# Patient Record
Sex: MALE | Race: WHITE | NOT HISPANIC OR LATINO | Employment: UNEMPLOYED | RURAL
[De-identification: names, ages, dates, MRNs, and addresses within clinical notes are randomized per-mention and may not be internally consistent; named-entity substitution may affect disease eponyms.]

---

## 2020-10-15 ENCOUNTER — HISTORICAL (OUTPATIENT)
Dept: ADMINISTRATIVE | Facility: HOSPITAL | Age: 50
End: 2020-10-15

## 2020-10-15 LAB
ALBUMIN SERPL BCP-MCNC: 4.1 G/DL (ref 3.5–5)
ALBUMIN/GLOB SERPL: 1.2 {RATIO}
ALP SERPL-CCNC: 84 U/L (ref 45–115)
ALT SERPL W P-5'-P-CCNC: 44 U/L (ref 16–61)
ANION GAP SERPL CALCULATED.3IONS-SCNC: 8 MMOL/L (ref 7–16)
AST SERPL W P-5'-P-CCNC: 20 U/L (ref 15–37)
BASOPHILS # BLD AUTO: 0.04 X10E3/UL (ref 0–0.2)
BASOPHILS NFR BLD AUTO: 0.6 % (ref 0–1)
BILIRUB SERPL-MCNC: 0.3 MG/DL (ref 0–1.2)
BUN SERPL-MCNC: 14 MG/DL (ref 7–18)
BUN/CREAT SERPL: 17
CALCIUM SERPL-MCNC: 8.8 MG/DL (ref 8.5–10.1)
CHLORIDE SERPL-SCNC: 110 MMOL/L (ref 98–107)
CHOLEST SERPL-MCNC: 190 MG/DL
CHOLEST/HDLC SERPL: 4.4 {RATIO}
CO2 SERPL-SCNC: 28 MMOL/L (ref 21–32)
CREAT SERPL-MCNC: 0.83 MG/DL (ref 0.7–1.3)
EOSINOPHIL # BLD AUTO: 0.1 X10E3/UL (ref 0–0.5)
EOSINOPHIL NFR BLD AUTO: 1.6 % (ref 1–4)
ERYTHROCYTE [DISTWIDTH] IN BLOOD BY AUTOMATED COUNT: 16.3 % (ref 11.5–14.5)
EST. AVERAGE GLUCOSE BLD GHB EST-MCNC: 134 MG/DL
GLOBULIN SER-MCNC: 3.5 G/DL (ref 2–4)
GLUCOSE SERPL-MCNC: 96 MG/DL (ref 74–106)
HBA1C MFR BLD HPLC: 6.6 %
HCT VFR BLD AUTO: 41.6 % (ref 40–54)
HDLC SERPL-MCNC: 43 MG/DL
HGB BLD-MCNC: 13.3 G/DL (ref 13.5–18)
IMM GRANULOCYTES # BLD AUTO: 0.02 X10E3/UL (ref 0–0.04)
IMM GRANULOCYTES NFR BLD: 0.3 % (ref 0–0.4)
LDLC SERPL CALC-MCNC: 106 MG/DL
LYMPHOCYTES # BLD AUTO: 1.7 X10E3/UL (ref 1–4.8)
LYMPHOCYTES NFR BLD AUTO: 27.4 % (ref 27–41)
MCH RBC QN AUTO: 26.2 PG (ref 27–31)
MCHC RBC AUTO-ENTMCNC: 32 G/DL (ref 32–36)
MCV RBC AUTO: 82.1 FL (ref 80–96)
MONOCYTES # BLD AUTO: 0.57 X10E3/UL (ref 0–0.8)
MONOCYTES NFR BLD AUTO: 9.2 % (ref 2–6)
MPC BLD CALC-MCNC: 11.1 FL (ref 9.4–12.4)
NEUTROPHILS # BLD AUTO: 3.78 X10E3/UL (ref 1.8–7.7)
NEUTROPHILS NFR BLD AUTO: 60.9 % (ref 53–65)
NRBC # BLD AUTO: 0 X10E3/UL (ref 0–0)
NRBC, AUTO (.00): 0 /100 (ref 0–0)
PLATELET # BLD AUTO: 289 X10E3/UL (ref 150–400)
POTASSIUM SERPL-SCNC: 4 MMOL/L (ref 3.5–5.1)
PROT SERPL-MCNC: 7.6 G/DL (ref 6.4–8.2)
RBC # BLD AUTO: 5.07 X10E6/UL (ref 4.6–6.2)
SODIUM SERPL-SCNC: 142 MMOL/L (ref 136–145)
TRIGL SERPL-MCNC: 206 MG/DL
WBC # BLD AUTO: 6.21 X10E3/UL (ref 4.5–11)

## 2020-11-29 ENCOUNTER — HISTORICAL (OUTPATIENT)
Dept: ADMINISTRATIVE | Facility: HOSPITAL | Age: 50
End: 2020-11-29

## 2021-08-26 ENCOUNTER — TELEPHONE (OUTPATIENT)
Dept: CARDIOLOGY | Facility: CLINIC | Age: 51
End: 2021-08-26

## 2021-08-26 DIAGNOSIS — I71.21 ASCENDING AORTIC ANEURYSM: Primary | ICD-10-CM

## 2021-08-26 RX ORDER — BUPROPION HYDROCHLORIDE 300 MG/1
TABLET ORAL
COMMUNITY
Start: 2021-08-16

## 2021-08-26 RX ORDER — PROPRANOLOL HYDROCHLORIDE 20 MG/1
TABLET ORAL
COMMUNITY
Start: 2021-04-19

## 2021-08-26 RX ORDER — OXYBUTYNIN CHLORIDE 5 MG/1
TABLET ORAL
COMMUNITY
Start: 2021-08-11

## 2021-08-26 RX ORDER — LEVOTHYROXINE SODIUM 125 UG/1
TABLET ORAL
COMMUNITY
Start: 2021-07-06

## 2021-08-26 RX ORDER — METFORMIN HYDROCHLORIDE 500 MG/1
TABLET, EXTENDED RELEASE ORAL
COMMUNITY
Start: 2021-08-08

## 2021-08-26 RX ORDER — RABEPRAZOLE SODIUM 20 MG/1
TABLET, DELAYED RELEASE ORAL
COMMUNITY
Start: 2021-06-15

## 2021-08-26 RX ORDER — NITROFURANTOIN 25; 75 MG/1; MG/1
CAPSULE ORAL
COMMUNITY
Start: 2021-06-01

## 2021-08-26 RX ORDER — SERTRALINE HYDROCHLORIDE 50 MG/1
TABLET, FILM COATED ORAL
COMMUNITY
Start: 2021-03-31

## 2021-08-26 RX ORDER — CICLOPIROX 7.7 MG/G
GEL TOPICAL
COMMUNITY
Start: 2021-07-01

## 2021-08-26 RX ORDER — HYDROCODONE BITARTRATE AND ACETAMINOPHEN 5; 325 MG/1; MG/1
1 TABLET ORAL EVERY 4 HOURS PRN
COMMUNITY
Start: 2021-06-29

## 2021-08-26 RX ORDER — BUPROPION HYDROCHLORIDE 150 MG/1
TABLET ORAL
COMMUNITY
Start: 2021-03-03

## 2021-08-26 RX ORDER — ARIPIPRAZOLE 5 MG/1
TABLET ORAL
COMMUNITY
Start: 2021-06-05

## 2021-08-26 RX ORDER — ZOLPIDEM TARTRATE 10 MG/1
TABLET ORAL
COMMUNITY
Start: 2021-05-19

## 2021-08-26 RX ORDER — ALBUTEROL SULFATE 90 UG/1
AEROSOL, METERED RESPIRATORY (INHALATION)
COMMUNITY
Start: 2021-03-03 | End: 2023-02-11 | Stop reason: SDUPTHER

## 2021-09-02 ENCOUNTER — HOSPITAL ENCOUNTER (OUTPATIENT)
Dept: RADIOLOGY | Facility: HOSPITAL | Age: 51
Discharge: HOME | End: 2021-09-02
Attending: STUDENT IN AN ORGANIZED HEALTH CARE EDUCATION/TRAINING PROGRAM
Payer: COMMERCIAL

## 2021-09-02 DIAGNOSIS — I71.21 ASCENDING AORTIC ANEURYSM: ICD-10-CM

## 2021-09-02 LAB — CREAT SERPL-MCNC: 0.9 MG/DL (ref 0.6–1.3)

## 2021-09-02 PROCEDURE — 71275 CT ANGIOGRAPHY CHEST: CPT | Mod: 26,,, | Performed by: RADIOLOGY

## 2021-09-02 PROCEDURE — 71275 CT ANGIOGRAPHY CHEST: CPT | Mod: TC

## 2021-09-02 PROCEDURE — 71275 CTA CHEST NON CORONARY: ICD-10-PCS | Mod: 26,,, | Performed by: RADIOLOGY

## 2021-09-02 PROCEDURE — 82565 ASSAY OF CREATININE: CPT

## 2021-09-02 PROCEDURE — 25500020 PHARM REV CODE 255: Performed by: STUDENT IN AN ORGANIZED HEALTH CARE EDUCATION/TRAINING PROGRAM

## 2021-09-02 RX ADMIN — IOPAMIDOL 100 ML: 755 INJECTION, SOLUTION INTRAVENOUS at 02:09

## 2021-12-22 ENCOUNTER — OFFICE VISIT (OUTPATIENT)
Dept: CARDIOLOGY | Facility: CLINIC | Age: 51
End: 2021-12-22
Payer: COMMERCIAL

## 2021-12-22 VITALS
HEIGHT: 67 IN | DIASTOLIC BLOOD PRESSURE: 80 MMHG | HEART RATE: 67 BPM | BODY MASS INDEX: 35.47 KG/M2 | RESPIRATION RATE: 18 BRPM | WEIGHT: 226 LBS | SYSTOLIC BLOOD PRESSURE: 140 MMHG

## 2021-12-22 DIAGNOSIS — R94.39 ABNORMAL CARDIOVASCULAR STRESS TEST: ICD-10-CM

## 2021-12-22 DIAGNOSIS — R94.39 ABNORMAL CARDIOVASCULAR STRESS TEST: Primary | ICD-10-CM

## 2021-12-22 DIAGNOSIS — I71.21 ASCENDING AORTIC ANEURYSM: ICD-10-CM

## 2021-12-22 PROCEDURE — 99214 PR OFFICE/OUTPT VISIT, EST, LEVL IV, 30-39 MIN: ICD-10-PCS | Mod: S$PBB,,, | Performed by: STUDENT IN AN ORGANIZED HEALTH CARE EDUCATION/TRAINING PROGRAM

## 2021-12-22 PROCEDURE — 99214 OFFICE O/P EST MOD 30 MIN: CPT | Mod: S$PBB,,, | Performed by: STUDENT IN AN ORGANIZED HEALTH CARE EDUCATION/TRAINING PROGRAM

## 2021-12-22 PROCEDURE — 93010 ELECTROCARDIOGRAM REPORT: CPT | Mod: S$PBB,,, | Performed by: STUDENT IN AN ORGANIZED HEALTH CARE EDUCATION/TRAINING PROGRAM

## 2021-12-22 PROCEDURE — 93005 ELECTROCARDIOGRAM TRACING: CPT | Mod: PBBFAC | Performed by: STUDENT IN AN ORGANIZED HEALTH CARE EDUCATION/TRAINING PROGRAM

## 2021-12-22 PROCEDURE — 99214 OFFICE O/P EST MOD 30 MIN: CPT | Mod: PBBFAC | Performed by: STUDENT IN AN ORGANIZED HEALTH CARE EDUCATION/TRAINING PROGRAM

## 2021-12-22 PROCEDURE — 93010 EKG 12-LEAD: ICD-10-PCS | Mod: S$PBB,,, | Performed by: STUDENT IN AN ORGANIZED HEALTH CARE EDUCATION/TRAINING PROGRAM

## 2021-12-22 RX ORDER — MELATONIN 10 MG
CAPSULE ORAL
COMMUNITY

## 2023-02-11 ENCOUNTER — OFFICE VISIT (OUTPATIENT)
Dept: FAMILY MEDICINE | Facility: CLINIC | Age: 53
End: 2023-02-11
Payer: COMMERCIAL

## 2023-02-11 VITALS
HEART RATE: 69 BPM | RESPIRATION RATE: 19 BRPM | BODY MASS INDEX: 36.1 KG/M2 | OXYGEN SATURATION: 97 % | DIASTOLIC BLOOD PRESSURE: 84 MMHG | SYSTOLIC BLOOD PRESSURE: 134 MMHG | HEIGHT: 67 IN | WEIGHT: 230 LBS | TEMPERATURE: 99 F

## 2023-02-11 DIAGNOSIS — J01.00 ACUTE MAXILLARY SINUSITIS, RECURRENCE NOT SPECIFIED: Primary | ICD-10-CM

## 2023-02-11 DIAGNOSIS — R05.9 COUGH, UNSPECIFIED TYPE: ICD-10-CM

## 2023-02-11 PROBLEM — R01.1 HEART MURMUR: Status: ACTIVE | Noted: 2023-02-11

## 2023-02-11 PROBLEM — E66.9 OBESITY: Status: ACTIVE | Noted: 2023-02-11

## 2023-02-11 PROBLEM — I10 ESSENTIAL HYPERTENSION: Status: ACTIVE | Noted: 2023-02-11

## 2023-02-11 PROBLEM — G47.61 PERIODIC LIMB MOVEMENT DISORDER: Status: ACTIVE | Noted: 2023-02-11

## 2023-02-11 PROBLEM — J45.909 ASTHMA: Status: ACTIVE | Noted: 2023-02-11

## 2023-02-11 PROBLEM — G47.33 OBSTRUCTIVE SLEEP APNEA SYNDROME: Status: ACTIVE | Noted: 2023-02-11

## 2023-02-11 PROBLEM — G47.00 INSOMNIA: Status: ACTIVE | Noted: 2023-02-11

## 2023-02-11 PROBLEM — K21.9 GASTROESOPHAGEAL REFLUX DISEASE: Status: ACTIVE | Noted: 2023-02-11

## 2023-02-11 PROBLEM — E11.9 DIABETES MELLITUS WITHOUT COMPLICATION: Status: ACTIVE | Noted: 2023-02-11

## 2023-02-11 PROBLEM — F32.A DEPRESSED: Status: ACTIVE | Noted: 2023-02-11

## 2023-02-11 PROBLEM — N21.0 CALCULUS OF BLADDER: Status: ACTIVE | Noted: 2023-02-11

## 2023-02-11 LAB
CTP QC/QA: YES
FLUAV AG NPH QL: NEGATIVE
FLUBV AG NPH QL: NEGATIVE
SARS-COV-2 AG RESP QL IA.RAPID: NEGATIVE

## 2023-02-11 PROCEDURE — 87428 SARSCOV & INF VIR A&B AG IA: CPT | Mod: QW,,, | Performed by: NURSE PRACTITIONER

## 2023-02-11 PROCEDURE — 99051 PR MEDICAL SERVICES, EVE/WKEND/HOLIDAY: ICD-10-PCS | Mod: ,,, | Performed by: NURSE PRACTITIONER

## 2023-02-11 PROCEDURE — 99214 PR OFFICE/OUTPT VISIT, EST, LEVL IV, 30-39 MIN: ICD-10-PCS | Mod: ,,, | Performed by: NURSE PRACTITIONER

## 2023-02-11 PROCEDURE — 87428 POCT SARS-COV2 (COVID) WITH FLU ANTIGEN: ICD-10-PCS | Mod: QW,,, | Performed by: NURSE PRACTITIONER

## 2023-02-11 PROCEDURE — 99214 OFFICE O/P EST MOD 30 MIN: CPT | Mod: ,,, | Performed by: NURSE PRACTITIONER

## 2023-02-11 PROCEDURE — 99051 MED SERV EVE/WKEND/HOLIDAY: CPT | Mod: ,,, | Performed by: NURSE PRACTITIONER

## 2023-02-11 RX ORDER — GUAIFENESIN 1200 MG/1
1 TABLET, EXTENDED RELEASE ORAL 2 TIMES DAILY PRN
Qty: 20 TABLET | Refills: 0 | Status: SHIPPED | OUTPATIENT
Start: 2023-02-11 | End: 2023-02-21

## 2023-02-11 RX ORDER — PREDNISONE 20 MG/1
40 TABLET ORAL DAILY
Qty: 10 TABLET | Refills: 0 | Status: SHIPPED | OUTPATIENT
Start: 2023-02-11 | End: 2023-02-16

## 2023-02-11 RX ORDER — ALBUTEROL SULFATE 90 UG/1
1-2 AEROSOL, METERED RESPIRATORY (INHALATION) EVERY 6 HOURS PRN
Qty: 18 G | Refills: 2 | Status: SHIPPED | OUTPATIENT
Start: 2023-02-11

## 2023-02-11 RX ORDER — AZITHROMYCIN 250 MG/1
TABLET, FILM COATED ORAL
Qty: 6 TABLET | Refills: 0 | Status: SHIPPED | OUTPATIENT
Start: 2023-02-11 | End: 2023-02-16

## 2023-02-11 NOTE — PATIENT INSTRUCTIONS
-Continue current medications as prescribed.  -Over-the-counter sinus irrigation or rinse as directed

## 2023-02-11 NOTE — PROGRESS NOTES
[facility]    Chief Complaint      Chief Complaint   Patient presents with    Cough    Nasal Congestion    Chest Congestion     States with productive cough of thick mucus no fever noted with OTC medications in use      History of Present Illness      Kartik Segura is a 52 y.o. male with chronic conditions of GERD, hypertension, and ELENA who presents today for complaints of URI symptoms x1 week.    URI   This is a new problem. The current episode started in the past 7 days. The problem has been gradually worsening. There has been no fever. Associated symptoms include congestion, coughing (dark colored), sinus pain and a sore throat. Pertinent negatives include no abdominal pain, diarrhea, dysuria, ear pain, headaches, nausea, rash, vomiting or wheezing. He has tried decongestant for the symptoms. The treatment provided no relief.     Past Medical History:  Past Medical History:   Diagnosis Date    Ascending aortic aneurysm     Diabetes mellitus      Past Surgical History:   has no past surgical history on file.    Social History:  Social History     Tobacco Use    Smoking status: Never    Smokeless tobacco: Never   Substance Use Topics    Alcohol use: Not Currently     I personally reviewed all past medical, surgical, and social.     Review of Systems   Constitutional:  Negative for fever and malaise/fatigue.   HENT:  Positive for congestion, sinus pain and sore throat. Negative for ear pain.    Eyes:  Negative for discharge and redness.   Respiratory:  Positive for cough (dark colored) and sputum production. Negative for shortness of breath and wheezing. Hemoptysis: dark discoloration.   Gastrointestinal:  Negative for abdominal pain, diarrhea, nausea and vomiting.   Genitourinary:  Negative for dysuria.   Musculoskeletal:  Negative for myalgias.   Skin:  Negative for itching and rash.   Neurological:  Negative for dizziness and headaches.   Endo/Heme/Allergies:  Does not bruise/bleed easily.       Medications:  Outpatient Encounter Medications as of 2/11/2023   Medication Sig Dispense Refill    ARIPiprazole (ABILIFY) 5 MG Tab       buPROPion (WELLBUTRIN XL) 150 MG TB24 tablet       buPROPion (WELLBUTRIN XL) 300 MG 24 hr tablet       ciclopirox 0.77 % Gel       levothyroxine (SYNTHROID) 125 MCG tablet       melatonin 10 mg Cap 1 tablet at bedtime as needed      metFORMIN (GLUCOPHAGE-XR) 500 MG ER 24hr tablet       oxybutynin (DITROPAN) 5 MG Tab       propranoloL (INDERAL) 20 MG tablet       RABEprazole (ACIPHEX) 20 mg tablet       sertraline (ZOLOFT) 50 MG tablet       zolpidem (AMBIEN) 10 mg Tab       [DISCONTINUED] albuterol (PROVENTIL/VENTOLIN HFA) 90 mcg/actuation inhaler       albuterol (PROVENTIL/VENTOLIN HFA) 90 mcg/actuation inhaler Inhale 1-2 puffs into the lungs every 6 (six) hours as needed for Wheezing or Shortness of Breath. 18 g 2    azithromycin (Z-MECCA) 250 MG tablet Take 2 tablets by mouth on day 1; Take 1 tablet by mouth on days 2-5 6 tablet 0    guaiFENesin 1,200 mg Ta12 Take 1 tablet by mouth 2 (two) times daily as needed (congestion). 20 tablet 0    HYDROcodone-acetaminophen (NORCO) 5-325 mg per tablet Take 1 tablet by mouth every 4 (four) hours as needed.      nitrofurantoin, macrocrystal-monohydrate, (MACROBID) 100 MG capsule       predniSONE (DELTASONE) 20 MG tablet Take 2 tablets (40 mg total) by mouth once daily. for 5 days 10 tablet 0     No facility-administered encounter medications on file as of 2/11/2023.     Allergies:  Review of patient's allergies indicates:   Allergen Reactions    Cefuroxime     Cefuroxime axetil Hives    Ciprofloxacin Hives    Doxycycline hyclate Hives    Sulfa (sulfonamide antibiotics)      Health Maintenance:    There is no immunization history on file for this patient.   Health Maintenance   Topic Date Due    Hepatitis C Screening  Never done    Lipid Panel  Never done    TETANUS VACCINE  Never done      Physical Exam      Vital Signs  Temp: 99.1 °F  "(37.3 °C)  Pulse: 69  Resp: 19  SpO2: 97 %  BP: 134/84  Pain Score:   5  Pain Loc: Throat  Height and Weight  Height: 5' 7" (170.2 cm)  Weight: 104.3 kg (230 lb)  BSA (Calculated - sq m): 2.22 sq meters  BMI (Calculated): 36  Weight in (lb) to have BMI = 25: 159.3]    Physical Exam  Vitals reviewed.   Constitutional:       Appearance: Normal appearance.   HENT:      Head: Normocephalic.      Right Ear: External ear normal.      Left Ear: External ear normal.      Nose: Congestion present.      Right Turbinates: Swollen.      Left Turbinates: Swollen.      Right Sinus: Maxillary sinus tenderness present.      Left Sinus: Maxillary sinus tenderness present.      Mouth/Throat:      Mouth: Mucous membranes are moist.      Pharynx: Posterior oropharyngeal erythema present.   Eyes:      General:         Right eye: No discharge.         Left eye: No discharge.      Conjunctiva/sclera: Conjunctivae normal.   Cardiovascular:      Rate and Rhythm: Normal rate and regular rhythm.      Pulses: Normal pulses.      Heart sounds: Normal heart sounds. No murmur heard.  Pulmonary:      Effort: Pulmonary effort is normal. No respiratory distress.      Breath sounds: Normal breath sounds.   Abdominal:      General: Bowel sounds are normal.   Musculoskeletal:         General: Normal range of motion.      Cervical back: Normal range of motion.   Skin:     General: Skin is warm.      Capillary Refill: Capillary refill takes less than 2 seconds.   Neurological:      Mental Status: He is alert and oriented to person, place, and time.   Psychiatric:         Mood and Affect: Mood normal.         Behavior: Behavior normal.         Thought Content: Thought content normal.         Judgment: Judgment normal.      Laboratory:    CBC:  Recent Labs   Lab 10/15/20  1209   WBC 6.21   RBC 5.07   Hemoglobin 13.3 L   Hematocrit 41.6   Platelet Count 289   MCV 82.1   MCH 26.2 L   MCHC 32.0     CMP:  Recent Labs   Lab 10/15/20  1209   Glucose 96   Calcium " 8.8   Albumin 4.1   Total Protein 7.6   Sodium 142   Potassium 4.0   CO2 28   Chloride 110 H   BUN 14   Alk Phos 84   ALT 44   AST 20   Bilirubin, Total 0.3     LIPIDS:  Recent Labs   Lab 10/15/20  1209   HDL Cholesterol 43   Cholesterol 190   Triglycerides 206   LDL Calculated 106   Cholesterol/HDL Ratio (Risk Factor) 4.4     A1C:  Recent Labs   Lab 10/15/20  1209   Hemoglobin A1C 6.6     Assessment/Plan     Problem List Items Addressed This Visit    None  Visit Diagnoses       Acute maxillary sinusitis, recurrence not specified    -  Primary    Relevant Medications    azithromycin (Z-MECCA) 250 MG tablet    predniSONE (DELTASONE) 20 MG tablet    Cough, unspecified type        Relevant Medications    guaiFENesin 1,200 mg Ta12    Other Relevant Orders    POCT SARS-COV2 (COVID) with Flu Antigen (Completed)           Kartik Segura is a 52 y.o.male with:    1. Cough, unspecified type  - POCT SARS-COV2 (COVID) with Flu Antigen  - guaiFENesin 1,200 mg Ta12; Take 1 tablet by mouth 2 (two) times daily as needed (congestion).  Dispense: 20 tablet; Refill: 0    2. Acute maxillary sinusitis, recurrence not specified  - azithromycin (Z-MECCA) 250 MG tablet; Take 2 tablets by mouth on day 1; Take 1 tablet by mouth on days 2-5  Dispense: 6 tablet; Refill: 0  - predniSONE (DELTASONE) 20 MG tablet; Take 2 tablets (40 mg total) by mouth once daily. for 5 days  Dispense: 10 tablet; Refill: 0  -Continue current medications as prescribed.  -Over-the-counter sinus irrigation or rinse as directed.    Chronic conditions status updated as per HPI.  Other than changes above, cont current medications and maintain follow up with specialists.  Return to clinic as needed.    Danielle Nicole, P  [facility]

## 2023-06-30 ENCOUNTER — OFFICE VISIT (OUTPATIENT)
Dept: CARDIOLOGY | Facility: CLINIC | Age: 53
End: 2023-06-30
Payer: COMMERCIAL

## 2023-06-30 VITALS
RESPIRATION RATE: 18 BRPM | DIASTOLIC BLOOD PRESSURE: 80 MMHG | HEART RATE: 69 BPM | BODY MASS INDEX: 38.14 KG/M2 | WEIGHT: 243 LBS | SYSTOLIC BLOOD PRESSURE: 120 MMHG | HEIGHT: 67 IN

## 2023-06-30 DIAGNOSIS — I10 ESSENTIAL HYPERTENSION: Primary | ICD-10-CM

## 2023-06-30 DIAGNOSIS — I10 ESSENTIAL HYPERTENSION: ICD-10-CM

## 2023-06-30 DIAGNOSIS — I71.21 ANEURYSM OF ASCENDING AORTA WITHOUT RUPTURE: Primary | ICD-10-CM

## 2023-06-30 PROCEDURE — 99215 OFFICE O/P EST HI 40 MIN: CPT | Mod: PBBFAC | Performed by: STUDENT IN AN ORGANIZED HEALTH CARE EDUCATION/TRAINING PROGRAM

## 2023-06-30 PROCEDURE — 99214 PR OFFICE/OUTPT VISIT, EST, LEVL IV, 30-39 MIN: ICD-10-PCS | Mod: S$PBB,,, | Performed by: STUDENT IN AN ORGANIZED HEALTH CARE EDUCATION/TRAINING PROGRAM

## 2023-06-30 PROCEDURE — 99214 OFFICE O/P EST MOD 30 MIN: CPT | Mod: S$PBB,,, | Performed by: STUDENT IN AN ORGANIZED HEALTH CARE EDUCATION/TRAINING PROGRAM

## 2023-06-30 PROCEDURE — 93010 ELECTROCARDIOGRAM REPORT: CPT | Mod: S$PBB,,, | Performed by: STUDENT IN AN ORGANIZED HEALTH CARE EDUCATION/TRAINING PROGRAM

## 2023-06-30 PROCEDURE — 93010 EKG 12-LEAD: ICD-10-PCS | Mod: S$PBB,,, | Performed by: STUDENT IN AN ORGANIZED HEALTH CARE EDUCATION/TRAINING PROGRAM

## 2023-06-30 PROCEDURE — 93005 ELECTROCARDIOGRAM TRACING: CPT | Mod: PBBFAC | Performed by: STUDENT IN AN ORGANIZED HEALTH CARE EDUCATION/TRAINING PROGRAM

## 2023-06-30 RX ORDER — IRON,CARB/VIT C/VIT B12/FOLIC 100-250-1
1 TABLET ORAL
COMMUNITY
Start: 2023-04-16

## 2023-06-30 NOTE — ASSESSMENT & PLAN NOTE
Ascending Aortic Aneursym: 4.3cm in 2016 and then 4.7cm on a CTA at [place]. [facility] with poor acoustic windows.   - Had a CTA 9/2021; visually appears slighlty enlarged however radiology reported it at 3.6cm. (my measurement 4.1-4.3)  - BP goal 130/80  - Avoid strenous exercise  - Repeat CTA aorta for surviellene; If stable can switch to 3-5 year surviellence

## 2023-06-30 NOTE — PROGRESS NOTES
"PCP: Cher Blount MD (Inactive)    Referring Provider:     Subjective:   Kartik Segura is a 52 y.o. male with hx of DM, HTN, stable ascending aortic aneurysm (4.1-4.3cm) who presents for followup.     6/30/203 - Doing well. Denies any symptoms.     12/2021 - Doing well. A1c is controlled at 5.4. Denies any CP, SOB, AYERS.     Fhx:   Shx: denies smoking, etoh or drug use     EKG 12/22/21 - NSR   CTA aorta 9/2021 - No significant enlargement of the ascending aorta      Lab Results   Component Value Date     10/15/2020    K 4.0 10/15/2020     (H) 10/15/2020    CO2 28 10/15/2020    BUN 14 10/15/2020    CREATININE 0.830 10/15/2020    CALCIUM 8.8 10/15/2020    ANIONGAP 8 10/15/2020    ESTGFRAFRICA 127 10/15/2020    EGFRNONAA 105 10/15/2020       Lab Results   Component Value Date    CHOL 190 10/15/2020     Lab Results   Component Value Date    HDL 43 10/15/2020     Lab Results   Component Value Date    LDLCALC 106 10/15/2020     Lab Results   Component Value Date    TRIG 206 10/15/2020     Lab Results   Component Value Date    CHOLHDL 4.4 10/15/2020       Lab Results   Component Value Date    WBC 6.21 10/15/2020    HGB 13.3 (L) 10/15/2020    HCT 41.6 10/15/2020    MCV 82.1 10/15/2020     10/15/2020           Review of Systems   Respiratory:  Negative for cough and shortness of breath.    Cardiovascular:  Negative for chest pain, palpitations, orthopnea, claudication, leg swelling and PND.       Objective:   /80   Pulse 69   Resp 18   Ht 5' 7" (1.702 m)   Wt 110.2 kg (243 lb)   BMI 38.06 kg/m²     Physical Exam  Vitals and nursing note reviewed.   Cardiovascular:      Rate and Rhythm: Normal rate and regular rhythm.      Pulses: Normal pulses.      Heart sounds: Normal heart sounds.   Pulmonary:      Breath sounds: Normal breath sounds.   Neurological:      Mental Status: He is oriented to person, place, and time.         Assessment:     1. Aneurysm of ascending aorta without rupture  CTA " Chest Aorta Non Coronary    Basic Metabolic Panel      2. Essential hypertension              Plan:   Ascending aortic aneurysm  Ascending Aortic Aneursym: 4.3cm in 2016 and then 4.7cm on a CTA at [place]. ECHo with poor acoustic windows.   - Had a CTA 9/2021; visually appears slighlty enlarged however radiology reported it at 3.6cm. (my measurement 4.1-4.3)  - BP goal 130/80  - Avoid strenous exercise  - Repeat CTA aorta for surviellene; If stable can switch to 3-5 year surviellence    Essential hypertension  Chronic controlled

## 2023-07-20 ENCOUNTER — HOSPITAL ENCOUNTER (OUTPATIENT)
Dept: RADIOLOGY | Facility: HOSPITAL | Age: 53
Discharge: HOME | End: 2023-07-20
Attending: STUDENT IN AN ORGANIZED HEALTH CARE EDUCATION/TRAINING PROGRAM
Payer: COMMERCIAL

## 2023-07-20 DIAGNOSIS — I71.21 ANEURYSM OF ASCENDING AORTA WITHOUT RUPTURE: ICD-10-CM

## 2023-07-20 PROCEDURE — 71275 CT ANGIOGRAPHY CHEST: CPT | Mod: 26,,, | Performed by: RADIOLOGY

## 2023-07-20 PROCEDURE — 71275 CTA CHEST AORTA NON CORONARY: ICD-10-PCS | Mod: 26,,, | Performed by: RADIOLOGY

## 2023-07-20 PROCEDURE — 25500020 PHARM REV CODE 255: Performed by: STUDENT IN AN ORGANIZED HEALTH CARE EDUCATION/TRAINING PROGRAM

## 2023-07-20 PROCEDURE — 71275 CT ANGIOGRAPHY CHEST: CPT | Mod: TC

## 2023-07-20 RX ADMIN — IOPAMIDOL 100 ML: 755 INJECTION, SOLUTION INTRAVENOUS at 10:07

## 2023-07-25 NOTE — PROGRESS NOTES
Pt. Notified aneurysm is stable measures 4.4cm keep regular follow up per Dr. Yuan. Pt. Voiced understanding.

## 2023-08-21 ENCOUNTER — OFFICE VISIT (OUTPATIENT)
Dept: FAMILY MEDICINE | Facility: CLINIC | Age: 53
End: 2023-08-21
Payer: COMMERCIAL

## 2023-08-21 VITALS
WEIGHT: 245 LBS | BODY MASS INDEX: 38.45 KG/M2 | RESPIRATION RATE: 18 BRPM | HEIGHT: 67 IN | DIASTOLIC BLOOD PRESSURE: 80 MMHG | TEMPERATURE: 98 F | SYSTOLIC BLOOD PRESSURE: 120 MMHG | OXYGEN SATURATION: 95 % | HEART RATE: 70 BPM

## 2023-08-21 DIAGNOSIS — H66.90 OTITIS MEDIA, UNSPECIFIED LATERALITY, UNSPECIFIED OTITIS MEDIA TYPE: Primary | ICD-10-CM

## 2023-08-21 PROCEDURE — 99213 PR OFFICE/OUTPT VISIT, EST, LEVL III, 20-29 MIN: ICD-10-PCS | Mod: ,,, | Performed by: INTERNAL MEDICINE

## 2023-08-21 PROCEDURE — 99213 OFFICE O/P EST LOW 20 MIN: CPT | Mod: ,,, | Performed by: INTERNAL MEDICINE

## 2023-08-21 RX ORDER — ERGOCALCIFEROL 1.25 MG/1
50000 CAPSULE ORAL
COMMUNITY
Start: 2023-05-14

## 2023-08-21 RX ORDER — AZITHROMYCIN 250 MG/1
TABLET, FILM COATED ORAL
Qty: 6 TABLET | Refills: 1 | Status: SHIPPED | OUTPATIENT
Start: 2023-08-21

## 2023-08-21 RX ORDER — MUPIROCIN 20 MG/G
OINTMENT TOPICAL
COMMUNITY
Start: 2023-06-06

## 2023-08-27 PROBLEM — H66.90 OTITIS MEDIA: Status: ACTIVE | Noted: 2023-08-27

## 2023-08-28 NOTE — PROGRESS NOTES
Subjective:       Patient ID: Kartik Segura is a 52 y.o. male.    Chief Complaint: Ear Injury (Ear infection right )    HPI  .  Patient complains of earache in the right ear this has been going on for about 3-5 days.  Patient stated he has been getting worse.  I was going to treat with amoxicillin however patient has an allergy listed against amoxicillin.  The plan Z-Mauro with 1 refill.  Right ear is inflamed.  There is minimal swelling of the right eardrum.  But I see no drainage the right eardrum is clear.  Current Medications:    Current Outpatient Medications:     ARIPiprazole (ABILIFY) 5 MG Tab, , Disp: , Rfl:     buPROPion (WELLBUTRIN XL) 150 MG TB24 tablet, , Disp: , Rfl:     buPROPion (WELLBUTRIN XL) 300 MG 24 hr tablet, , Disp: , Rfl:     ciclopirox 0.77 % Gel, , Disp: , Rfl:     ergocalciferol (ERGOCALCIFEROL) 50,000 unit Cap, Take 50,000 Units by mouth every 7 days., Disp: , Rfl:     HYDROcodone-acetaminophen (NORCO) 5-325 mg per tablet, Take 1 tablet by mouth every 4 (four) hours as needed., Disp: , Rfl:     IRON 100 PLUS Tab, Take 1 tablet by mouth., Disp: , Rfl:     levothyroxine (SYNTHROID) 125 MCG tablet, , Disp: , Rfl:     melatonin 10 mg Cap, 1 tablet at bedtime as needed, Disp: , Rfl:     metFORMIN (GLUCOPHAGE-XR) 500 MG ER 24hr tablet, , Disp: , Rfl:     mupirocin (BACTROBAN) 2 % ointment, TWICE A DAY TO THE RIGHT GREAT TOE FOR ONE WEEK, Disp: , Rfl:     nitrofurantoin, macrocrystal-monohydrate, (MACROBID) 100 MG capsule, , Disp: , Rfl:     oxybutynin (DITROPAN) 5 MG Tab, , Disp: , Rfl:     propranoloL (INDERAL) 20 MG tablet, , Disp: , Rfl:     RABEprazole (ACIPHEX) 20 mg tablet, , Disp: , Rfl:     sertraline (ZOLOFT) 50 MG tablet, , Disp: , Rfl:     zolpidem (AMBIEN) 10 mg Tab, , Disp: , Rfl:     albuterol (PROVENTIL/VENTOLIN HFA) 90 mcg/actuation inhaler, Inhale 1-2 puffs into the lungs every 6 (six) hours as needed for Wheezing or Shortness of Breath. (Patient not taking: Reported on  "6/30/2023), Disp: 18 g, Rfl: 2    azithromycin (Z-MECCA) 250 MG tablet, Take 2 tablets by mouth on day 1; Take 1 tablet by mouth on days 2-5, Disp: 6 tablet, Rfl: 1           Review of Systems   Constitutional:  Negative for appetite change and fatigue.   HENT:  Negative for nasal congestion and rhinorrhea.    Eyes:  Negative for redness and visual disturbance.   Respiratory:  Negative for cough and shortness of breath.    Cardiovascular:  Negative for chest pain and leg swelling.   Gastrointestinal:  Negative for abdominal pain, constipation and diarrhea.   Endocrine: Negative for polyuria.   Genitourinary:  Negative for difficulty urinating, dysuria and erectile dysfunction.   Musculoskeletal:  Negative for arthralgias and myalgias.   Integumentary:  Negative for rash and mole/lesion.   All other systems reviewed and are negative.               Vitals:    08/21/23 1043   BP: 120/80   BP Location: Right arm   Patient Position: Sitting   BP Method: Large (Automatic)   Pulse: 70   Resp: 18   Temp: 97.9 °F (36.6 °C)   TempSrc: Temporal   SpO2: 95%   Weight: 111.1 kg (245 lb)   Height: 5' 7" (1.702 m)        Physical Exam  Vitals and nursing note reviewed.   Constitutional:       Appearance: Normal appearance.   Cardiovascular:      Rate and Rhythm: Normal rate and regular rhythm.      Pulses: Normal pulses.      Heart sounds: Normal heart sounds.   Pulmonary:      Effort: Pulmonary effort is normal.      Breath sounds: Normal breath sounds.   Abdominal:      General: Abdomen is flat. Bowel sounds are normal.      Palpations: Abdomen is soft.   Musculoskeletal:         General: Normal range of motion.   Skin:     General: Skin is warm and dry.   Neurological:      General: No focal deficit present.      Mental Status: He is alert and oriented to person, place, and time. Mental status is at baseline.           Last Labs:     No visits with results within 1 Month(s) from this visit.   Latest known visit with results is: "   Lab Visit on 06/30/2023   Component Date Value    Sodium 06/30/2023 143     Potassium 06/30/2023 4.0     Chloride 06/30/2023 108 (H)     CO2 06/30/2023 28     Anion Gap 06/30/2023 11     Glucose 06/30/2023 112 (H)     BUN 06/30/2023 16     Creatinine 06/30/2023 0.97     BUN/Creatinine Ratio 06/30/2023 16     Calcium 06/30/2023 9.2     eGFR 06/30/2023 94        Last Imaging:  CTA Chest Aorta Non Coronary  Narrative: EXAMINATION:  CTA CHEST AORTA NON CORONARY    CLINICAL HISTORY:  Aneurysm of the ascending aorta, without ruptureAortic aneurysm, known or suspected;    COMPARISON:  CT chest September 2, 2021    TECHNIQUE:  Multiple axial tomographic images of the chest were obtained after the administration of 100 cc Isovue 370 intravenous contrast. CT angiogram protocol followed. Coronal, sagittal, and 3-D reformatted images provided.    FINDINGS:  Aneurysmal dilatation of the ascending thoracic aorta measuring up to 4.4 cm.  This is similar to prior examination.  Borderline cardiomegaly.  Small area of ground-glass atelectasis/scarring within the dorsal/inferior lingula.  No meenu pulmonary edema or focal consolidating pneumonia.  Visualized upper abdomen demonstrates no acute abnormality.  Scattered skeletal degenerative change.  Impression: Aneurysmal dilatation of the ascending thoracic aorta measuring up to 4.4 cm. This is similar to prior examination.  Borderline cardiomegaly. Small area of ground-glass atelectasis/scarring within the dorsal/inferior lingula.    The CT exam was performed using one or more of the following dose    reduction techniques- Automated exposure control, adjustment of the mA    and/or kV according to patient size, and/or use of iterative    reconstructed technique.    Point of Service: [facility]    Electronically signed by: Luis Meraz  Date:    07/20/2023  Time:    11:09         **Labs and x-rays personally reviewed by me    ** reviewed      Objective:         Assessment:       1. Otitis media, unspecified laterality, unspecified otitis media type             Plan:         @[email or web address] [facility]@

## 2023-12-18 DIAGNOSIS — R56.9 SEIZURES: Primary | ICD-10-CM

## 2024-02-07 ENCOUNTER — OFFICE VISIT (OUTPATIENT)
Dept: NEUROLOGY | Facility: CLINIC | Age: 54
End: 2024-02-07
Payer: COMMERCIAL

## 2024-02-07 VITALS
BODY MASS INDEX: 38.45 KG/M2 | HEART RATE: 90 BPM | SYSTOLIC BLOOD PRESSURE: 132 MMHG | OXYGEN SATURATION: 99 % | DIASTOLIC BLOOD PRESSURE: 82 MMHG | HEIGHT: 67 IN | WEIGHT: 245 LBS | RESPIRATION RATE: 18 BRPM

## 2024-02-07 DIAGNOSIS — R56.9 SEIZURES: Primary | ICD-10-CM

## 2024-02-07 PROCEDURE — 99215 OFFICE O/P EST HI 40 MIN: CPT | Mod: PBBFAC | Performed by: PSYCHIATRY & NEUROLOGY

## 2024-02-07 PROCEDURE — 99204 OFFICE O/P NEW MOD 45 MIN: CPT | Mod: S$PBB,,, | Performed by: PSYCHIATRY & NEUROLOGY

## 2024-02-07 RX ORDER — APIXABAN 5 MG (74)
1 KIT ORAL 2 TIMES DAILY
COMMUNITY
Start: 2023-11-13

## 2024-02-07 NOTE — PROGRESS NOTES
Subjective:       Patient ID: Kartik Segura is a 53 y.o. male     Chief Complaint:    Chief Complaint   Patient presents with    Seizures     Pt. States he doing great.        Allergies:  Cefuroxime, Cefuroxime axetil, Ciprofloxacin, Doxycycline hyclate, and Sulfa (sulfonamide antibiotics)    Current Medications:    Outpatient Encounter Medications as of 2/7/2024   Medication Sig Dispense Refill    albuterol (PROVENTIL/VENTOLIN HFA) 90 mcg/actuation inhaler Inhale 1-2 puffs into the lungs every 6 (six) hours as needed for Wheezing or Shortness of Breath. 18 g 2    ARIPiprazole (ABILIFY) 5 MG Tab       azithromycin (Z-MECCA) 250 MG tablet Take 2 tablets by mouth on day 1; Take 1 tablet by mouth on days 2-5 6 tablet 1    buPROPion (WELLBUTRIN XL) 150 MG TB24 tablet       buPROPion (WELLBUTRIN XL) 300 MG 24 hr tablet       ciclopirox 0.77 % Gel       ELIQUIS DVT-PE TREAT 30D START 5 mg (74 tabs) DsPk TAKE BY MOUTH 2 TABLETS TWICE A DAY FOR 5 DAYS, THEN 1 TABLET TWICE A DAY FOR PE AND DVT      ergocalciferol (ERGOCALCIFEROL) 50,000 unit Cap Take 50,000 Units by mouth every 7 days.      HYDROcodone-acetaminophen (NORCO) 5-325 mg per tablet Take 1 tablet by mouth every 4 (four) hours as needed.      IRON 100 PLUS Tab Take 1 tablet by mouth.      levothyroxine (SYNTHROID) 125 MCG tablet       melatonin 10 mg Cap 1 tablet at bedtime as needed      metFORMIN (GLUCOPHAGE-XR) 500 MG ER 24hr tablet       mupirocin (BACTROBAN) 2 % ointment TWICE A DAY TO THE RIGHT GREAT TOE FOR ONE WEEK      nitrofurantoin, macrocrystal-monohydrate, (MACROBID) 100 MG capsule       oxybutynin (DITROPAN) 5 MG Tab       propranoloL (INDERAL) 20 MG tablet       RABEprazole (ACIPHEX) 20 mg tablet       sertraline (ZOLOFT) 50 MG tablet       zolpidem (AMBIEN) 10 mg Tab        No facility-administered encounter medications on file as of 2/7/2024.       History of Present Illness  52 yo WM referred for eval of seizures that occurred after having  "craniotomy in SEPT 2023 at [facility] for evacuation of abscess ? Pt unsure of exact details but feels former middle ear infection had permeated to his brain.  He has pending appt r/u Nsgy at [facility].  He was then placed on Keppra 500mg bid   His only other seizure was in NOV but he has no recollection of the event ? He denies michelle tongue biting or loss of urine however of concern is the fact that he has stillbeen driving since then which is against law after any loss of consciousness            Past Medical History:   Diagnosis Date    Ascending aortic aneurysm     Diabetes mellitus        History reviewed. No pertinent surgical history.    Social History  Mr. Segura  reports that he has never smoked. He has never used smokeless tobacco. He reports that he does not currently use alcohol.    Family History  Mr.'s Segura family history includes Heart disease in his father.    Review of Systems  Review of Systems   Neurological:  Positive for seizures.   Psychiatric/Behavioral:  Positive for depression. The patient is nervous/anxious.    All other systems reviewed and are negative.     Objective:   /82 (BP Location: Right arm, Patient Position: Sitting, BP Method: Large (Manual))   Pulse 90   Resp 18   Ht 5' 7" (1.702 m)   Wt 111.1 kg (245 lb)   SpO2 99%   BMI 38.37 kg/m²    NEUROLOGICAL EXAMINATION:     MENTAL STATUS   Oriented to person, place, and time.   Level of consciousness: alert  Knowledge: good.     CRANIAL NERVES   Cranial nerves II through XII intact.        EOM palsy     MOTOR EXAM     Strength   Strength 5/5 throughout.     GAIT AND COORDINATION     Gait  Gait: normal       Physical Exam  Vitals reviewed.   Constitutional:       Appearance: He is obese.   Neurological:      General: No focal deficit present.      Mental Status: He is alert and oriented to person, place, and time. Mental status is at baseline.      Cranial Nerves: Cranial nerves 2-12 are intact.      Motor: Motor strength is normal.     " Gait: Gait is intact.          Assessment:     Seizures  Comments:  only one since craniotomy in SEPT 2023  Orders:  -     Ambulatory referral/consult to Neurology         Primary Diagnosis and ICD10  Seizures [R56.9]    Plan:     Patient Instructions   Cont Keppra 500 mg twice daily   F/u Nsgy at [facility] next week   Per state law No driving for 6 months after any alteration or loss of consiousness  F/u 6 months         There are no discontinued medications.    Requested Prescriptions      No prescriptions requested or ordered in this encounter

## 2024-02-07 NOTE — PATIENT INSTRUCTIONS
Cont Keppra 500 mg twice daily   F/u Nsgy at [facility] next week   Per state law No driving for 6 months after any alteration or loss of consiousness  F/u 3 months

## 2024-02-21 ENCOUNTER — OFFICE VISIT (OUTPATIENT)
Dept: FAMILY MEDICINE | Facility: CLINIC | Age: 54
End: 2024-02-21
Payer: COMMERCIAL

## 2024-02-21 VITALS
DIASTOLIC BLOOD PRESSURE: 85 MMHG | OXYGEN SATURATION: 96 % | SYSTOLIC BLOOD PRESSURE: 145 MMHG | BODY MASS INDEX: 38.37 KG/M2 | HEART RATE: 72 BPM | HEIGHT: 67 IN | TEMPERATURE: 99 F

## 2024-02-21 DIAGNOSIS — J06.9 UPPER RESPIRATORY INFECTION, VIRAL: ICD-10-CM

## 2024-02-21 DIAGNOSIS — Z20.828 EXPOSURE TO VIRAL DISEASE: Primary | ICD-10-CM

## 2024-02-21 LAB
CTP QC/QA: YES
FLUAV AG NPH QL: NEGATIVE
FLUBV AG NPH QL: NEGATIVE
S PYO RRNA THROAT QL PROBE: NEGATIVE
SARS-COV-2 AG RESP QL IA.RAPID: NEGATIVE

## 2024-02-21 PROCEDURE — 87426 SARSCOV CORONAVIRUS AG IA: CPT | Mod: QW,,, | Performed by: FAMILY MEDICINE

## 2024-02-21 PROCEDURE — 99051 MED SERV EVE/WKEND/HOLIDAY: CPT | Mod: ,,, | Performed by: FAMILY MEDICINE

## 2024-02-21 PROCEDURE — 87880 STREP A ASSAY W/OPTIC: CPT | Mod: QW,,, | Performed by: FAMILY MEDICINE

## 2024-02-21 PROCEDURE — 87804 INFLUENZA ASSAY W/OPTIC: CPT | Mod: 59,QW,, | Performed by: FAMILY MEDICINE

## 2024-02-21 PROCEDURE — 99213 OFFICE O/P EST LOW 20 MIN: CPT | Mod: ,,, | Performed by: FAMILY MEDICINE

## 2024-02-21 RX ORDER — CETIRIZINE HYDROCHLORIDE 10 MG/1
TABLET ORAL
Qty: 90 TABLET | Refills: 3 | Status: SHIPPED | OUTPATIENT
Start: 2024-02-21

## 2024-02-21 RX ORDER — POTASSIUM CHLORIDE 750 MG/1
20 TABLET, EXTENDED RELEASE ORAL DAILY
COMMUNITY
Start: 2024-02-09

## 2024-02-21 RX ORDER — CICLOPIROX OLAMINE 7.7 MG/G
CREAM TOPICAL 2 TIMES DAILY
COMMUNITY
Start: 2024-02-08 | End: 2024-05-07

## 2024-02-21 RX ORDER — FLUTICASONE PROPIONATE 50 MCG
2 SPRAY, SUSPENSION (ML) NASAL DAILY
Qty: 18.2 ML | Refills: 11 | Status: SHIPPED | OUTPATIENT
Start: 2024-02-21 | End: 2024-05-21

## 2024-02-22 NOTE — PROGRESS NOTES
Subjective     Patient ID: Kartik Segura is a 53 y.o. male.    Chief Complaint: Nasal Congestion, Cough, and Sore Throat (With trouble swallowing)    Symptoms began 5 days ago.  No facial or sinus pressure.  No fever.  Mild cough    Cough  Associated symptoms include a sore throat.   Sore Throat   Associated symptoms include coughing.     Review of Systems   HENT:  Positive for sore throat.    Respiratory:  Positive for cough.           Objective     Physical Exam  Constitutional:       General: He is not in acute distress.     Appearance: He is not ill-appearing.   HENT:      Right Ear: Tympanic membrane normal.      Left Ear: Tympanic membrane normal.      Nose: Congestion present.      Mouth/Throat:      Pharynx: No posterior oropharyngeal erythema.   Cardiovascular:      Rate and Rhythm: Normal rate and regular rhythm.   Pulmonary:      Effort: Pulmonary effort is normal.      Breath sounds: Normal breath sounds.   Neurological:      Mental Status: He is alert.            Assessment and Plan     1. Exposure to viral disease  -     SARS Coronavirus 2 Antigen, POCT  -     POCT Influenza A/B Rapid Antigen  -     POCT rapid strep A    2. Upper respiratory infection, viral    Flu COVID and strep all negative    Call for worsening symptoms         No follow-ups on file.

## 2024-05-07 ENCOUNTER — OFFICE VISIT (OUTPATIENT)
Dept: NEUROLOGY | Facility: CLINIC | Age: 54
End: 2024-05-07
Payer: COMMERCIAL

## 2024-05-07 VITALS
BODY MASS INDEX: 34.53 KG/M2 | WEIGHT: 220 LBS | HEART RATE: 73 BPM | OXYGEN SATURATION: 96 % | RESPIRATION RATE: 18 BRPM | SYSTOLIC BLOOD PRESSURE: 128 MMHG | DIASTOLIC BLOOD PRESSURE: 84 MMHG | HEIGHT: 67 IN

## 2024-05-07 DIAGNOSIS — R56.9 SEIZURE: ICD-10-CM

## 2024-05-07 DIAGNOSIS — R29.898 COMPLAINTS OF LEG WEAKNESS: Primary | ICD-10-CM

## 2024-05-07 PROCEDURE — 99214 OFFICE O/P EST MOD 30 MIN: CPT | Mod: S$PBB,,, | Performed by: PSYCHIATRY & NEUROLOGY

## 2024-05-07 PROCEDURE — 99215 OFFICE O/P EST HI 40 MIN: CPT | Mod: PBBFAC | Performed by: PSYCHIATRY & NEUROLOGY

## 2024-05-07 RX ORDER — LEVETIRACETAM 500 MG/1
500 TABLET ORAL DAILY
COMMUNITY

## 2024-05-07 NOTE — PATIENT INSTRUCTIONS
Taper off Keppra over next or month   Consider tapering Wellbutrin as lowers seizure threshold - if replaced per Pscyh NP  Needs PT/Rehab   F/u prn   
22-Jun-2020 11:12

## 2024-05-07 NOTE — PROGRESS NOTES
Subjective:       Patient ID: Kartik Segura is a 53 y.o. male     Chief Complaint:    Chief Complaint   Patient presents with    Seizures     Pt. States no seizures        Allergies:  Cefuroxime, Cefuroxime axetil, Ciprofloxacin, Doxycycline hyclate, and Sulfa (sulfonamide antibiotics)    Current Medications:    Outpatient Encounter Medications as of 5/7/2024   Medication Sig Dispense Refill    buPROPion (WELLBUTRIN XL) 150 MG TB24 tablet       buPROPion (WELLBUTRIN XL) 300 MG 24 hr tablet       cetirizine (ZYRTEC) 10 MG tablet One at bedtime for allergies 90 tablet 3    ciclopirox 0.77 % Gel       ELIQUIS DVT-PE TREAT 30D START 5 mg (74 tabs) DsPk TAKE BY MOUTH 2 TABLETS TWICE A DAY FOR 5 DAYS, THEN 1 TABLET TWICE A DAY FOR PE AND DVT      ergocalciferol (ERGOCALCIFEROL) 50,000 unit Cap Take 50,000 Units by mouth every 7 days.      fluticasone propionate (FLONASE) 50 mcg/actuation nasal spray 2 sprays (100 mcg total) by Each Nostril route once daily. 18.2 mL 11    IRON 100 PLUS Tab Take 1 tablet by mouth.      levETIRAcetam (KEPPRA) 500 MG Tab       levothyroxine (SYNTHROID) 125 MCG tablet       melatonin 10 mg Cap 1 tablet at bedtime as needed      metFORMIN (GLUCOPHAGE-XR) 500 MG ER 24hr tablet       oxybutynin (DITROPAN) 5 MG Tab       potassium chloride SA (K-DUR,KLOR-CON M) 10 MEQ tablet Take 20 mEq by mouth.      propranoloL (INDERAL) 20 MG tablet       RABEprazole (ACIPHEX) 20 mg tablet       zolpidem (AMBIEN) 10 mg Tab       [DISCONTINUED] albuterol (PROVENTIL/VENTOLIN HFA) 90 mcg/actuation inhaler Inhale 1-2 puffs into the lungs every 6 (six) hours as needed for Wheezing or Shortness of Breath. 18 g 2    [DISCONTINUED] ARIPiprazole (ABILIFY) 5 MG Tab       [DISCONTINUED] azithromycin (Z-MECCA) 250 MG tablet Take 2 tablets by mouth on day 1; Take 1 tablet by mouth on days 2-5 6 tablet 1    [DISCONTINUED] ciclopirox (LOPROX) 0.77 % Crea Apply topically 2 (two) times daily.      [DISCONTINUED]  "HYDROcodone-acetaminophen (NORCO) 5-325 mg per tablet Take 1 tablet by mouth every 4 (four) hours as needed.      [DISCONTINUED] mupirocin (BACTROBAN) 2 % ointment TWICE A DAY TO THE RIGHT GREAT TOE FOR ONE WEEK      [DISCONTINUED] nitrofurantoin, macrocrystal-monohydrate, (MACROBID) 100 MG capsule       [DISCONTINUED] sertraline (ZOLOFT) 50 MG tablet        No facility-administered encounter medications on file as of 5/7/2024.       History of Present Illness  54 yo WM s/p craniotomy from brain abscess in B NOV 2023   Recent MRI brain showed no residual abscess   May taper off Keppra - Nsgy in agreement              Past Medical History:   Diagnosis Date    Ascending aortic aneurysm     Diabetes mellitus        History reviewed. No pertinent surgical history.    Social History  Mr. Segura  reports that he has never smoked. He has never used smokeless tobacco. He reports that he does not currently use alcohol.    Family History  Mr.'s Segura family history includes Heart disease in his father.    Review of Systems  Review of Systems   Neurological:  Positive for seizures.   Psychiatric/Behavioral:  Positive for depression.    All other systems reviewed and are negative.     Objective:   /84 (BP Location: Right arm, Patient Position: Sitting, BP Method: Large (Manual))   Pulse 73   Resp 18   Ht 5' 7" (1.702 m)   Wt 99.8 kg (220 lb)   SpO2 96%   BMI 34.46 kg/m²    NEUROLOGICAL EXAMINATION:     MENTAL STATUS   Oriented to person, place, and time.   Level of consciousness: alert  Knowledge: good.     CRANIAL NERVES   Cranial nerves II through XII intact.     MOTOR EXAM     Strength   Strength 5/5 throughout.     REFLEXES     Reflexes   Right brachioradialis: 2+  Left brachioradialis: 2+  Right biceps: 2+  Left biceps: 2+  Right triceps: 2+  Left triceps: 2+  Right patellar: 2+  Left patellar: 2+  Right achilles: 2+  Left achilles: 2+  Right plantar: normal  Left plantar: normal    GAIT AND COORDINATION "     Gait  Gait: normal       Physical Exam  Vitals reviewed.   Constitutional:       Appearance: He is obese.   Neurological:      General: No focal deficit present.      Mental Status: He is alert and oriented to person, place, and time. Mental status is at baseline.      Cranial Nerves: Cranial nerves 2-12 are intact.      Motor: Motor strength is normal.     Gait: Gait is intact.      Deep Tendon Reflexes:      Reflex Scores:       Tricep reflexes are 2+ on the right side and 2+ on the left side.       Bicep reflexes are 2+ on the right side and 2+ on the left side.       Brachioradialis reflexes are 2+ on the right side and 2+ on the left side.       Patellar reflexes are 2+ on the right side and 2+ on the left side.       Achilles reflexes are 2+ on the right side and 2+ on the left side.         Assessment:     There are no diagnoses linked to this encounter.     Primary Diagnosis and ICD10  No primary diagnosis found.    Plan:     Patient Instructions   Taper off Keppra over next or month   Needs PT/Rehab   F/u prn     Medications Discontinued During This Encounter   Medication Reason    albuterol (PROVENTIL/VENTOLIN HFA) 90 mcg/actuation inhaler     ARIPiprazole (ABILIFY) 5 MG Tab     azithromycin (Z-MECCA) 250 MG tablet     ciclopirox (LOPROX) 0.77 % Crea     HYDROcodone-acetaminophen (NORCO) 5-325 mg per tablet     mupirocin (BACTROBAN) 2 % ointment     nitrofurantoin, macrocrystal-monohydrate, (MACROBID) 100 MG capsule     sertraline (ZOLOFT) 50 MG tablet        Requested Prescriptions      No prescriptions requested or ordered in this encounter        Dependent for feeding at this exam.

## 2024-05-21 ENCOUNTER — OFFICE VISIT (OUTPATIENT)
Dept: FAMILY MEDICINE | Facility: CLINIC | Age: 54
End: 2024-05-21
Payer: COMMERCIAL

## 2024-05-21 VITALS
SYSTOLIC BLOOD PRESSURE: 111 MMHG | HEIGHT: 67 IN | DIASTOLIC BLOOD PRESSURE: 74 MMHG | BODY MASS INDEX: 36.88 KG/M2 | WEIGHT: 235 LBS | RESPIRATION RATE: 20 BRPM | OXYGEN SATURATION: 96 % | HEART RATE: 74 BPM | TEMPERATURE: 98 F

## 2024-05-21 DIAGNOSIS — J45.909 ASTHMA, UNSPECIFIED ASTHMA SEVERITY, UNSPECIFIED WHETHER COMPLICATED, UNSPECIFIED WHETHER PERSISTENT: ICD-10-CM

## 2024-05-21 DIAGNOSIS — J06.9 UPPER RESPIRATORY TRACT INFECTION, UNSPECIFIED TYPE: Primary | ICD-10-CM

## 2024-05-21 PROBLEM — H66.90 OTITIS MEDIA: Status: RESOLVED | Noted: 2023-08-27 | Resolved: 2024-05-21

## 2024-05-21 LAB
CTP QC/QA: YES
SARS-COV-2 RDRP RESP QL NAA+PROBE: NEGATIVE

## 2024-05-21 PROCEDURE — 87635 SARS-COV-2 COVID-19 AMP PRB: CPT | Mod: QW,,, | Performed by: NURSE PRACTITIONER

## 2024-05-21 PROCEDURE — 99213 OFFICE O/P EST LOW 20 MIN: CPT | Mod: 25,,, | Performed by: NURSE PRACTITIONER

## 2024-05-21 PROCEDURE — 96372 THER/PROPH/DIAG INJ SC/IM: CPT | Mod: ,,, | Performed by: NURSE PRACTITIONER

## 2024-05-21 RX ORDER — ALBUTEROL SULFATE 90 UG/1
2 AEROSOL, METERED RESPIRATORY (INHALATION) EVERY 6 HOURS PRN
Qty: 18 G | Refills: 0 | Status: SHIPPED | OUTPATIENT
Start: 2024-05-21 | End: 2025-05-21

## 2024-05-21 RX ORDER — DEXAMETHASONE SODIUM PHOSPHATE 4 MG/ML
4 INJECTION, SOLUTION INTRA-ARTICULAR; INTRALESIONAL; INTRAMUSCULAR; INTRAVENOUS; SOFT TISSUE
Status: COMPLETED | OUTPATIENT
Start: 2024-05-21 | End: 2024-05-21

## 2024-05-21 RX ADMIN — DEXAMETHASONE SODIUM PHOSPHATE 4 MG: 4 INJECTION, SOLUTION INTRA-ARTICULAR; INTRALESIONAL; INTRAMUSCULAR; INTRAVENOUS; SOFT TISSUE at 02:05

## 2024-05-21 NOTE — PROGRESS NOTES
LILIAN Watkins        PATIENT NAME: Kartik Segura  : 1970  DATE: 24  MRN: [id number]      Patient PCP Information       Provider PCP Type    Cher Blount MD General            Reason for Visit / Chief Complaint: Fatigue, Cough, Sore Throat, Headache, and Medication Refill (Possible to get albuterol inhaler. )       Update PCP  Update Chief Complaint         History of Present Illness / Problem Focused Workflow     Kartik Segura presents to the clinic with Fatigue, Cough, Sore Throat, Headache, and Medication Refill (Possible to get albuterol inhaler. )     HPI  Mr Segura presents to clinic with fatigue, cough, sore throat and headache.       Medical / Social / Family History     Past Medical History:   Diagnosis Date    Ascending aortic aneurysm     4.4 cm last CT    Brain abscess     Diabetes mellitus     Hypothyroidism, unspecified     Neurogenic bladder     ELENA (obstructive sleep apnea)     Pulmonary embolism     Spina bifida        Past Surgical History:   Procedure Laterality Date    BLADDER AUGMENTATION      CRANIOTOMY      spinda bifida repair      TONSILLECTOMY      TYMPANOSTOMY TUBE PLACEMENT Right     uvuloplasty         Social History    reports that he has never smoked. He has never been exposed to tobacco smoke. He has never used smokeless tobacco. He reports that he does not currently use alcohol.    Family History  's family history includes Heart disease in his father.    Medications and Allergies     Medications  Outpatient Medications Marked as Taking for the 24 encounter (Office Visit) with Shivani Shipley FNP   Medication Sig Dispense Refill    buPROPion (WELLBUTRIN XL) 150 MG TB24 tablet Take 150 mg by mouth once daily.      cetirizine (ZYRTEC) 10 MG tablet One at bedtime for allergies 90 tablet 3    ELIQUIS DVT-PE TREAT 30D START 5 mg (74 tabs) DsPk Take 1 tablet by mouth 2 (two) times a day.      ergocalciferol (ERGOCALCIFEROL) 50,000 unit Cap Take  "50,000 Units by mouth every 7 days.      levETIRAcetam (KEPPRA) 500 MG Tab Take 500 mg by mouth once daily.      levothyroxine (SYNTHROID) 125 MCG tablet Take 125 mcg by mouth before breakfast.      melatonin 10 mg Cap 1 tablet at bedtime as needed      metFORMIN (GLUCOPHAGE-XR) 500 MG ER 24hr tablet Take 500 mg by mouth once daily.      oxybutynin (DITROPAN) 5 MG Tab Take 5 mg by mouth once daily.      potassium chloride SA (K-DUR,KLOR-CON M) 10 MEQ tablet Take 20 mEq by mouth once daily.      propranoloL (INDERAL) 20 MG tablet Take 20 mg by mouth once daily.      RABEprazole (ACIPHEX) 20 mg tablet Take 20 mg by mouth once daily.      zolpidem (AMBIEN) 10 mg Tab Take 10 mg by mouth nightly as needed (.).         Allergies  Review of patient's allergies indicates:   Allergen Reactions    Cefuroxime     Cefuroxime axetil Hives    Ciprofloxacin Hives    Doxycycline hyclate Hives    Sulfa (sulfonamide antibiotics)        Physical Examination     Vitals:    05/21/24 1403   BP: 111/74   BP Location: Left arm   Patient Position: Sitting   BP Method: Medium (Automatic)   Pulse: 74   Resp: 20   Temp: 97.9 °F (36.6 °C)   TempSrc: Oral   SpO2: 96%   Weight: 106.6 kg (235 lb)   Height: 5' 7" (1.702 m)       Physical Exam  Vitals reviewed.   Constitutional:       Appearance: Normal appearance. He is obese.   HENT:      Head: Normocephalic.      Right Ear: External ear normal.      Left Ear: External ear normal.      Nose: Congestion present.      Mouth/Throat:      Mouth: Mucous membranes are moist.   Eyes:      Extraocular Movements: Extraocular movements intact.   Cardiovascular:      Rate and Rhythm: Normal rate.      Pulses: Normal pulses.      Heart sounds: Normal heart sounds.   Pulmonary:      Effort: Pulmonary effort is normal. No respiratory distress.      Breath sounds: Normal breath sounds. No stridor. No wheezing, rhonchi or rales.   Chest:      Chest wall: No tenderness.   Musculoskeletal:         General: Normal " range of motion.      Cervical back: Normal range of motion.   Skin:     General: Skin is warm and dry.      Capillary Refill: Capillary refill takes less than 2 seconds.   Neurological:      General: No focal deficit present.      Mental Status: He is alert and oriented to person, place, and time.   Psychiatric:         Mood and Affect: Mood normal.         Behavior: Behavior normal.         Thought Content: Thought content normal.         Judgment: Judgment normal.           Office Visit on 05/21/2024   Component Date Value Ref Range Status    POC Rapid COVID 05/21/2024 Negative  Negative Final     Acceptable 05/21/2024 Yes   Final             Assessment and Plan (including Health Maintenance)      Problem List  Smart Sets  Document Outside HM   :    Plan:     1. Upper respiratory tract infection, unspecified type  -     POCT COVID-19 Rapid Screening    2. Asthma, unspecified asthma severity, unspecified whether complicated, unspecified whether persistent  -     albuterol (PROAIR HFA) 90 mcg/actuation inhaler; Inhale 2 puffs into the lungs every 6 (six) hours as needed for Wheezing. Rescue  Dispense: 18 g; Refill: 0  -     dexAMETHasone injection 4 mg      COVID negative   Discussed viral nature and progression of illness  Saline and suction with nose aicha and/or bulb as needed for nasal congestion.   Increase fluids and monitor urine output  RTC if no improvement in 2-3 days    There are no Patient Instructions on file for this visit.       Health Maintenance Due   Topic Date Due    Hepatitis C Screening  Never done    Lipid Panel  Never done    HIV Screening  Never done    TETANUS VACCINE  Never done    Colorectal Cancer Screening  Never done    Shingles Vaccine (1 of 2) Never done    COVID-19 Vaccine (1 - 2023-24 season) Never done         There is no immunization history on file for this patient.         The patient has no Health Maintenance topics of status Not Due    Future Appointments    Date Time Provider Department Center   11/7/2024  8:30 AM Checo Schwarz MD [facility] NEURO [facility]            Signature:  LILIAN Watkins  [facility]     Date of encounter: 5/21/24   [Negative] : Heme/Lymph

## 2024-07-09 ENCOUNTER — OFFICE VISIT (OUTPATIENT)
Dept: CARDIOLOGY | Facility: CLINIC | Age: 54
End: 2024-07-09
Payer: COMMERCIAL

## 2024-07-09 VITALS
DIASTOLIC BLOOD PRESSURE: 80 MMHG | HEIGHT: 67 IN | RESPIRATION RATE: 18 BRPM | BODY MASS INDEX: 37.35 KG/M2 | HEART RATE: 72 BPM | SYSTOLIC BLOOD PRESSURE: 130 MMHG | WEIGHT: 238 LBS

## 2024-07-09 DIAGNOSIS — I10 ESSENTIAL HYPERTENSION: ICD-10-CM

## 2024-07-09 DIAGNOSIS — I10 ESSENTIAL HYPERTENSION: Primary | ICD-10-CM

## 2024-07-09 DIAGNOSIS — I71.21 ANEURYSM OF ASCENDING AORTA WITHOUT RUPTURE: Primary | ICD-10-CM

## 2024-07-09 PROCEDURE — 99214 OFFICE O/P EST MOD 30 MIN: CPT | Mod: PBBFAC | Performed by: STUDENT IN AN ORGANIZED HEALTH CARE EDUCATION/TRAINING PROGRAM

## 2024-07-09 PROCEDURE — 99213 OFFICE O/P EST LOW 20 MIN: CPT | Mod: S$PBB,,, | Performed by: STUDENT IN AN ORGANIZED HEALTH CARE EDUCATION/TRAINING PROGRAM

## 2024-07-09 PROCEDURE — 99999 PR PBB SHADOW E&M-EST. PATIENT-LVL IV: CPT | Mod: PBBFAC,,, | Performed by: STUDENT IN AN ORGANIZED HEALTH CARE EDUCATION/TRAINING PROGRAM

## 2024-07-09 NOTE — PROGRESS NOTES
"PCP: Cher Blount MD (Inactive)    Referring Provider:     Subjective:   Kartik Segura is a 53 y.o. male with hx of DM, HTN, stable ascending aortic aneurysm (4.1-4.3cm) who presents for followup.     7/9/24 - Doing well. Was at [place] for chest pain was admitted overnight. Will get records. Reports getting an ECHo and CT scan.     6/30/203 - Doing well. Denies any symptoms.     12/2021 - Doing well. A1c is controlled at 5.4. Denies any CP, SOB, AYERS.     Fhx:   Shx: denies smoking, etoh or drug use     EKG 12/22/21 - NSR   CTA aorta 9/2021 - No significant enlargement of the ascending aorta  CTA aorta 7/2023 -  Aneurysmal dilatation of the ascending thoracic aorta measuring up to 4.4 cm. This is similar to prior examination. Borderline cardiomegaly. Small area of ground-glass atelectasis/scarring within the dorsal/inferior lingula.     Lab Results   Component Value Date     06/30/2023    K 4.0 06/30/2023     (H) 06/30/2023    CO2 28 06/30/2023    BUN 16 06/30/2023    CREATININE 0.97 06/30/2023    CALCIUM 9.2 06/30/2023    ANIONGAP 11 06/30/2023    ESTGFRAFRICA 127 10/15/2020    EGFRNONAA 105 10/15/2020       Lab Results   Component Value Date    CHOL 190 10/15/2020     Lab Results   Component Value Date    HDL 43 10/15/2020     Lab Results   Component Value Date    LDLCALC 106 10/15/2020     Lab Results   Component Value Date    TRIG 206 10/15/2020     Lab Results   Component Value Date    CHOLHDL 4.4 10/15/2020       Lab Results   Component Value Date    WBC 6.21 10/15/2020    HGB 13.3 (L) 10/15/2020    HCT 41.6 10/15/2020    MCV 82.1 10/15/2020     10/15/2020           Review of Systems   Respiratory:  Negative for cough and shortness of breath.    Cardiovascular:  Negative for chest pain, palpitations, orthopnea, claudication, leg swelling and PND.         Objective:   /80   Pulse 72   Resp 18   Ht 5' 7" (1.702 m)   Wt 108 kg (238 lb)   BMI 37.28 kg/m²     Physical " Exam  Vitals and nursing note reviewed.   Cardiovascular:      Rate and Rhythm: Normal rate and regular rhythm.      Pulses: Normal pulses.      Heart sounds: Normal heart sounds.   Pulmonary:      Breath sounds: Normal breath sounds.   Neurological:      Mental Status: He is oriented to person, place, and time.           Assessment:     1. Aneurysm of ascending aorta without rupture        2. Essential hypertension                Plan:   Ascending aortic aneurysm  Ascending Aortic Aneursym: 4.3cm in 2016 and then 4.7cm on a CTA at [place]. ECHo with poor acoustic windows.   - Had a CTA 9/2023 - 4.4 cm - stable  - BP goal 130/80  - Avoid strenous exercise  - can switch to 3-5 year surviellence    Essential hypertension  Chronic controlled

## 2024-07-09 NOTE — ASSESSMENT & PLAN NOTE
Ascending Aortic Aneursym: 4.3cm in 2016 and then 4.7cm on a CTA at [place]. ECHo with poor acoustic windows.   - Had a CTA 9/2023 - 4.4 cm - stable  - BP goal 130/80  - Avoid strenous exercise  - can switch to 3-5 year surviellence

## 2024-09-09 ENCOUNTER — OFFICE VISIT (OUTPATIENT)
Dept: FAMILY MEDICINE | Facility: CLINIC | Age: 54
End: 2024-09-09
Payer: COMMERCIAL

## 2024-09-09 VITALS
BODY MASS INDEX: 39.24 KG/M2 | DIASTOLIC BLOOD PRESSURE: 70 MMHG | WEIGHT: 250 LBS | SYSTOLIC BLOOD PRESSURE: 125 MMHG | TEMPERATURE: 98 F | OXYGEN SATURATION: 96 % | HEIGHT: 67 IN | HEART RATE: 81 BPM | RESPIRATION RATE: 19 BRPM

## 2024-09-09 DIAGNOSIS — R91.1 SOLITARY PULMONARY NODULE: ICD-10-CM

## 2024-09-09 DIAGNOSIS — R05.9 COUGH, UNSPECIFIED TYPE: ICD-10-CM

## 2024-09-09 DIAGNOSIS — Z20.828 EXPOSURE TO VIRAL DISEASE: ICD-10-CM

## 2024-09-09 DIAGNOSIS — J18.9 PNEUMONIA OF LEFT LUNG DUE TO INFECTIOUS ORGANISM, UNSPECIFIED PART OF LUNG: Primary | ICD-10-CM

## 2024-09-09 LAB
CTP QC/QA: YES
MOLECULAR STREP A: NEGATIVE
POC MOLECULAR INFLUENZA A AGN: NEGATIVE
POC MOLECULAR INFLUENZA B AGN: NEGATIVE
SARS-COV-2 RDRP RESP QL NAA+PROBE: NEGATIVE

## 2024-09-09 PROCEDURE — 99214 OFFICE O/P EST MOD 30 MIN: CPT | Mod: 25,,, | Performed by: FAMILY MEDICINE

## 2024-09-09 PROCEDURE — 94640 AIRWAY INHALATION TREATMENT: CPT | Mod: 59,,, | Performed by: FAMILY MEDICINE

## 2024-09-09 PROCEDURE — 87502 INFLUENZA DNA AMP PROBE: CPT | Mod: QW,,, | Performed by: FAMILY MEDICINE

## 2024-09-09 PROCEDURE — 96372 THER/PROPH/DIAG INJ SC/IM: CPT | Mod: ,,, | Performed by: FAMILY MEDICINE

## 2024-09-09 PROCEDURE — 87651 STREP A DNA AMP PROBE: CPT | Mod: QW,,, | Performed by: FAMILY MEDICINE

## 2024-09-09 PROCEDURE — 87635 SARS-COV-2 COVID-19 AMP PRB: CPT | Mod: QW,,, | Performed by: FAMILY MEDICINE

## 2024-09-09 RX ORDER — ALBUTEROL SULFATE 90 UG/1
2 INHALANT RESPIRATORY (INHALATION) EVERY 6 HOURS PRN
Qty: 18 G | Refills: 0 | Status: SHIPPED | OUTPATIENT
Start: 2024-09-09 | End: 2025-09-09

## 2024-09-09 RX ORDER — DEXAMETHASONE SODIUM PHOSPHATE 4 MG/ML
4 INJECTION, SOLUTION INTRA-ARTICULAR; INTRALESIONAL; INTRAMUSCULAR; INTRAVENOUS; SOFT TISSUE
Status: COMPLETED | OUTPATIENT
Start: 2024-09-09 | End: 2024-09-09

## 2024-09-09 RX ORDER — AZITHROMYCIN 250 MG/1
TABLET, FILM COATED ORAL
Qty: 6 TABLET | Refills: 0 | Status: SHIPPED | OUTPATIENT
Start: 2024-09-09

## 2024-09-09 RX ORDER — METHYLPREDNISOLONE 4 MG/1
TABLET ORAL
Qty: 21 EACH | Refills: 0 | Status: SHIPPED | OUTPATIENT
Start: 2024-09-09

## 2024-09-09 RX ORDER — ALBUTEROL SULFATE 0.83 MG/ML
2.5 SOLUTION RESPIRATORY (INHALATION)
Status: COMPLETED | OUTPATIENT
Start: 2024-09-09 | End: 2024-09-09

## 2024-09-09 RX ADMIN — DEXAMETHASONE SODIUM PHOSPHATE 4 MG: 4 INJECTION, SOLUTION INTRA-ARTICULAR; INTRALESIONAL; INTRAMUSCULAR; INTRAVENOUS; SOFT TISSUE at 12:09

## 2024-09-09 RX ADMIN — ALBUTEROL SULFATE 2.5 MG: 0.83 SOLUTION RESPIRATORY (INHALATION) at 12:09

## 2024-09-09 NOTE — PROGRESS NOTES
Subjective:       Patient ID: Kartik Segura is a 53 y.o. male.    Chief Complaint: Cough, Nasal Congestion, and Generalized Body Aches (Symptoms started friday)    Cough  Associated symptoms include postnasal drip, rhinorrhea and a sore throat. Pertinent negatives include no chest pain, chills, ear pain, eye redness, fever, headaches, myalgias, rash, shortness of breath or wheezing. There is no history of environmental allergies.     Review of Systems   Constitutional:  Negative for activity change, appetite change, chills, diaphoresis, fatigue, fever and unexpected weight change.   HENT:  Positive for nasal congestion, postnasal drip, rhinorrhea, sinus pressure/congestion and sore throat. Negative for dental problem, drooling, ear discharge, ear pain, facial swelling, hearing loss, mouth sores, nosebleeds, sneezing, tinnitus, trouble swallowing, voice change and goiter.    Eyes:  Negative for photophobia, pain, discharge, redness, itching and visual disturbance.   Respiratory:  Positive for cough. Negative for apnea, choking, chest tightness, shortness of breath, wheezing and stridor.    Cardiovascular:  Negative for chest pain, palpitations, leg swelling and claudication.   Gastrointestinal:  Negative for abdominal distention, abdominal pain, anal bleeding, blood in stool, change in bowel habit, constipation, diarrhea, nausea, vomiting, reflux and fecal incontinence.   Endocrine: Negative for cold intolerance, heat intolerance, polydipsia, polyphagia and polyuria.   Genitourinary:  Negative for bladder incontinence, decreased urine volume, difficulty urinating, discharge, dysuria, enuresis, erectile dysfunction, flank pain, frequency, genital sores, hematuria, penile pain, testicular pain and urgency.   Musculoskeletal:  Negative for arthralgias, back pain, gait problem, joint swelling, leg pain, myalgias, neck pain, neck stiffness and joint deformity.   Integumentary:  Negative for pallor, rash, wound and  mole/lesion.   Allergic/Immunologic: Negative for environmental allergies, food allergies and frequent infections.   Neurological:  Negative for dizziness, vertigo, tremors, seizures, syncope, facial asymmetry, speech difficulty, weakness, light-headedness, numbness, headaches, memory loss and coordination difficulties.   Hematological:  Negative for adenopathy. Does not bruise/bleed easily.   Psychiatric/Behavioral:  Negative for agitation, behavioral problems, confusion, decreased concentration, dysphoric mood, hallucinations, self-injury, sleep disturbance and suicidal ideas. The patient is not nervous/anxious and is not hyperactive.          Objective:      Physical Exam  Vitals reviewed.   Constitutional:       Appearance: Normal appearance. He is normal weight.   HENT:      Head: Normocephalic and atraumatic.      Right Ear: Tympanic membrane and ear canal normal.      Left Ear: Tympanic membrane, ear canal and external ear normal.      Nose: Congestion and rhinorrhea present.      Mouth/Throat:      Mouth: Mucous membranes are moist.      Pharynx: Oropharynx is clear. Posterior oropharyngeal erythema present.   Eyes:      Extraocular Movements: Extraocular movements intact.      Conjunctiva/sclera: Conjunctivae normal.      Pupils: Pupils are equal, round, and reactive to light.   Cardiovascular:      Rate and Rhythm: Normal rate and regular rhythm.      Pulses: Normal pulses.      Heart sounds: Normal heart sounds.   Pulmonary:      Effort: Pulmonary effort is normal.      Breath sounds: Rhonchi present.   Abdominal:      General: Abdomen is flat. Bowel sounds are normal.      Palpations: Abdomen is soft.   Musculoskeletal:         General: Normal range of motion.      Cervical back: Normal range of motion and neck supple.   Skin:     General: Skin is warm and dry.   Neurological:      General: No focal deficit present.      Mental Status: He is alert and oriented to person, place, and time. Mental status is  at baseline.   Psychiatric:         Mood and Affect: Mood normal.         Behavior: Behavior normal.         Thought Content: Thought content normal.         Judgment: Judgment normal.         Assessment:       1. Pneumonia of left lung due to infectious organism, unspecified part of lung    2. Exposure to viral disease    3. Cough, unspecified type    4. Solitary pulmonary nodule        Plan:     Pneumonia of left lung due to infectious organism, unspecified part of lung  -     dexAMETHasone injection 4 mg  -     azithromycin (Z-EMCCA) 250 MG tablet; Take 2 tablets by mouth on day 1; Take 1 tablet by mouth on days 2-5  Dispense: 6 tablet; Refill: 0    Exposure to viral disease  -     POCT COVID-19 Rapid Screening  -     POCT Influenza A/B Molecular  -     POCT Strep A, Molecular    Cough, unspecified type  -     X-Ray Chest PA And Lateral; Future; Expected date: 09/09/2024    Solitary pulmonary nodule  -     CT Chest Without Contrast; Future; Expected date: 09/09/2024    Other orders  -     methylPREDNISolone (MEDROL DOSEPACK) 4 mg tablet; use as directed  Dispense: 21 each; Refill: 0  -     albuterol (VENTOLIN HFA) 90 mcg/actuation inhaler; Inhale 2 puffs into the lungs every 6 (six) hours as needed for Wheezing. Rescue  Dispense: 18 g; Refill: 0       Will get CT of chest. Will treat for possible pneumonia.

## 2024-10-04 ENCOUNTER — OFFICE VISIT (OUTPATIENT)
Dept: NEUROLOGY | Facility: CLINIC | Age: 54
End: 2024-10-04
Payer: COMMERCIAL

## 2024-10-04 VITALS
WEIGHT: 250 LBS | HEART RATE: 78 BPM | HEIGHT: 67 IN | OXYGEN SATURATION: 97 % | RESPIRATION RATE: 18 BRPM | BODY MASS INDEX: 39.24 KG/M2 | SYSTOLIC BLOOD PRESSURE: 132 MMHG | DIASTOLIC BLOOD PRESSURE: 82 MMHG

## 2024-10-04 DIAGNOSIS — R55 SYNCOPE AND COLLAPSE: Primary | ICD-10-CM

## 2024-10-04 PROCEDURE — 99214 OFFICE O/P EST MOD 30 MIN: CPT | Mod: S$PBB,,, | Performed by: PSYCHIATRY & NEUROLOGY

## 2024-10-04 PROCEDURE — 99999 PR PBB SHADOW E&M-EST. PATIENT-LVL V: CPT | Mod: PBBFAC,,, | Performed by: PSYCHIATRY & NEUROLOGY

## 2024-10-04 PROCEDURE — 99215 OFFICE O/P EST HI 40 MIN: CPT | Mod: PBBFAC | Performed by: PSYCHIATRY & NEUROLOGY

## 2024-10-04 RX ORDER — ARIPIPRAZOLE 5 MG/1
5 TABLET ORAL
COMMUNITY
Start: 2024-08-19

## 2024-10-04 RX ORDER — BENZONATATE 100 MG/1
200 CAPSULE ORAL
COMMUNITY
Start: 2024-09-23 | End: 2024-10-07

## 2024-10-04 RX ORDER — METHOCARBAMOL 500 MG/1
500 TABLET, FILM COATED ORAL 2 TIMES DAILY
COMMUNITY
Start: 2024-09-15

## 2024-10-04 NOTE — PATIENT INSTRUCTIONS
Cont current mgmt   No need for resuming seizure meds  Consider tapering off Propranolol as he only takes once daily or intermittently  Keep good sleep habits   F/u 6 months

## 2024-10-04 NOTE — PROGRESS NOTES
Subjective:       Patient ID: Kartik Segura is a 53 y.o. male     Chief Complaint:    Chief Complaint   Patient presents with    Hospital Follow Up     Pt. States syncope Monday hit head and shoulder.here for results from test.        Allergies:  Cefuroxime, Cefuroxime axetil, Ciprofloxacin, Doxycycline hyclate, Sulfa (sulfonamide antibiotics), and Sulfamethoxazole-trimethoprim    Current Medications:    Outpatient Encounter Medications as of 10/4/2024   Medication Sig Dispense Refill    albuterol (PROAIR HFA) 90 mcg/actuation inhaler Inhale 2 puffs into the lungs every 6 (six) hours as needed for Wheezing. Rescue 18 g 0    albuterol (VENTOLIN HFA) 90 mcg/actuation inhaler Inhale 2 puffs into the lungs every 6 (six) hours as needed for Wheezing. Rescue 18 g 0    ARIPiprazole (ABILIFY) 5 MG Tab Take 5 mg by mouth.      azithromycin (Z-MECCA) 250 MG tablet Take 2 tablets by mouth on day 1; Take 1 tablet by mouth on days 2-5 6 tablet 0    benzonatate (TESSALON) 100 MG capsule Take 200 mg by mouth.      buPROPion (WELLBUTRIN XL) 150 MG TB24 tablet Take 150 mg by mouth once daily.      cetirizine (ZYRTEC) 10 MG tablet One at bedtime for allergies 90 tablet 3    ELIQUIS DVT-PE TREAT 30D START 5 mg (74 tabs) DsPk Take 1 tablet by mouth 2 (two) times a day.      ergocalciferol (ERGOCALCIFEROL) 50,000 unit Cap Take 50,000 Units by mouth every 7 days.      levETIRAcetam (KEPPRA) 500 MG Tab Take 500 mg by mouth once daily.      levothyroxine (SYNTHROID) 125 MCG tablet Take 125 mcg by mouth before breakfast.      melatonin 10 mg Cap       metFORMIN (GLUCOPHAGE-XR) 500 MG ER 24hr tablet Take 500 mg by mouth once daily.      methocarbamoL (ROBAXIN) 500 MG Tab Take 500 mg by mouth 2 (two) times daily.      methylPREDNISolone (MEDROL DOSEPACK) 4 mg tablet use as directed 21 each 0    oxybutynin (DITROPAN) 5 MG Tab Take 5 mg by mouth once daily.      potassium chloride SA (K-DUR,KLOR-CON M) 10 MEQ tablet Take 20 mEq by mouth  once daily.      propranoloL (INDERAL) 20 MG tablet Take 20 mg by mouth once daily.      RABEprazole (ACIPHEX) 20 mg tablet Take 20 mg by mouth once daily.      zolpidem (AMBIEN) 10 mg Tab Take 10 mg by mouth nightly as needed (.).       Facility-Administered Encounter Medications as of 10/4/2024   Medication Dose Route Frequency Provider Last Rate Last Admin    [DISCONTINUED] acetaminophen tablet  650 mg Oral  Provider, Generic External Data        [DISCONTINUED] apixaban tablet  5 mg Oral  Provider, Generic External Data        [DISCONTINUED] ARIPiprazole tablet  5 mg Oral  Provider, Generic External Data        [DISCONTINUED] benzonatate capsule  200 mg Oral  Provider, Generic External Data        [DISCONTINUED] buPROPion 24 hr tablet  300 mg Oral  Provider, Generic External Data        [DISCONTINUED] cetirizine tablet  10 mg Oral  Provider, Generic External Data        [DISCONTINUED] docusate sodium capsule  100 mg Oral  Provider, Generic External Data        [DISCONTINUED] ergocalciferol capsule  50,000 Units Oral  Provider, Generic External Data        [DISCONTINUED] fluticasone propionate 50 mcg/actuation nasal spray  2 spray Nasal  Provider, Generic External Data        [DISCONTINUED] lactated ringers infusion  100 mL/hr Intravenous  Provider, Generic External Data        [DISCONTINUED] levETIRAcetam injection  500 mg Intravenous  Provider, Generic External Data        [DISCONTINUED] levothyroxine tablet  125 mcg Oral  Provider, Generic External Data        [DISCONTINUED] LIDOcaine 5 % patch  1 patch Transdermal  Provider, Generic External Data        [DISCONTINUED] metFORMIN tablet  500 mg Oral  Provider, Generic External Data        [DISCONTINUED] ondansetron injection  4 mg Intravenous  Provider, Generic External Data        [DISCONTINUED] oxybutynin 24 hr tablet  5 mg Oral  Provider, Generic External Data        [DISCONTINUED] pantoprazole EC tablet  20 mg Oral  Provider, Generic External Data         [DISCONTINUED] potassium chloride SA CR tablet  20 mEq Oral  Provider, Generic External Data        [DISCONTINUED] propranoloL tablet  20 mg Oral  Provider, Generic External Data        [DISCONTINUED] traMADoL tablet  50 mg Oral  Provider, Generic External Data        [DISCONTINUED] zaleplon capsule  10 mg Oral  Provider, Generic External Data           History of Present Illness  52 yo WM w/ spina bifida and neurogenic bladder - s/p R temporal craniotomy last year [facility] for abscess   Preventive Keppra stopped 5 mos ago as No seizures noted and none since  5 days ago had syncopal spell in middle of breakfast where he had prev poor sleep and nausea and then had LOC - called neighbor who drove him to [facility] and full stroke, seizure and syncope w/u negative - CT head, MRI brain, echo, carotid doppler, EEG - all negative   He denied any loss of urine or tongue biting but did have nausea after taking several meds in am prior to eating   He is also desiring to stop Proprnaolol          Past Medical History:   Diagnosis Date    Ascending aortic aneurysm     4.4 cm last CT    Brain abscess     Diabetes mellitus     Hypothyroidism, unspecified     Neurogenic bladder     ELENA (obstructive sleep apnea)     Pulmonary embolism     Spina bifida        Past Surgical History:   Procedure Laterality Date    BLADDER AUGMENTATION      CRANIOTOMY      spinda bifida repair      TONSILLECTOMY      TYMPANOSTOMY TUBE PLACEMENT Right     uvuloplasty         Social History  Mr. Segura  reports that he has never smoked. He has never been exposed to tobacco smoke. He has never used smokeless tobacco. He reports that he does not currently use alcohol.    Family History  Mr.'s Segura family history includes Heart disease in his father.    Review of Systems  Review of Systems   Neurological:  Positive for loss of consciousness.   Psychiatric/Behavioral:  Positive for depression. The patient is nervous/anxious.    All other systems reviewed and are  "negative.     Objective:   /82 (BP Location: Right arm, Patient Position: Sitting)   Pulse 78   Resp 18   Ht 5' 7" (1.702 m)   Wt 113.4 kg (250 lb)   SpO2 97%   BMI 39.16 kg/m²    NEUROLOGICAL EXAMINATION:     MENTAL STATUS   Oriented to person, place, and time.   Level of consciousness: alert  Knowledge: good.     CRANIAL NERVES   Cranial nerves II through XII intact.        EOM palsy      MOTOR EXAM     Strength   Strength 5/5 throughout.     GAIT AND COORDINATION     Gait  Gait: normal       Physical Exam  Vitals reviewed.   Constitutional:       Appearance: He is obese.   Neurological:      General: No focal deficit present.      Mental Status: He is alert and oriented to person, place, and time. Mental status is at baseline.      Cranial Nerves: Cranial nerves 2-12 are intact.      Motor: Motor strength is normal.     Gait: Gait is intact.          Assessment:     There are no diagnoses linked to this encounter.     Primary Diagnosis and ICD10  No primary diagnosis found.    Plan:     There are no Patient Instructions on file for this visit.    There are no discontinued medications.    Requested Prescriptions      No prescriptions requested or ordered in this encounter       "

## 2024-10-21 PROCEDURE — 88305 TISSUE EXAM BY PATHOLOGIST: CPT | Mod: 26,,, | Performed by: PATHOLOGY

## 2024-10-21 PROCEDURE — 88305 TISSUE EXAM BY PATHOLOGIST: CPT | Mod: TC,SUR | Performed by: DERMATOLOGY

## 2024-10-22 ENCOUNTER — LAB REQUISITION (OUTPATIENT)
Dept: LAB | Facility: HOSPITAL | Age: 54
End: 2024-10-22
Attending: DERMATOLOGY
Payer: COMMERCIAL

## 2024-10-22 DIAGNOSIS — D22.72 MELANOCYTIC NEVI OF LEFT LOWER LIMB, INCLUDING HIP: ICD-10-CM

## 2024-10-22 DIAGNOSIS — L53.8 OTHER SPECIFIED ERYTHEMATOUS CONDITIONS: ICD-10-CM

## 2024-10-22 DIAGNOSIS — R20.8 OTHER DISTURBANCES OF SKIN SENSATION: ICD-10-CM

## 2024-10-22 DIAGNOSIS — L29.89 OTHER PRURITUS: ICD-10-CM

## 2024-10-23 LAB
ESTROGEN SERPL-MCNC: NORMAL PG/ML
INSULIN SERPL-ACNC: NORMAL U[IU]/ML
LAB AP GROSS DESCRIPTION: NORMAL
LAB AP LABORATORY NOTES: NORMAL
LAB AP SPEC A DDX: NORMAL
LAB AP SPEC A MORPHOLOGY: NORMAL
LAB AP SPEC A PROCEDURE: NORMAL
T3RU NFR SERPL: NORMAL %

## 2024-10-26 ENCOUNTER — OFFICE VISIT (OUTPATIENT)
Dept: FAMILY MEDICINE | Facility: CLINIC | Age: 54
End: 2024-10-26
Payer: COMMERCIAL

## 2024-10-26 VITALS
WEIGHT: 251 LBS | RESPIRATION RATE: 20 BRPM | HEART RATE: 66 BPM | TEMPERATURE: 98 F | BODY MASS INDEX: 39.39 KG/M2 | DIASTOLIC BLOOD PRESSURE: 82 MMHG | OXYGEN SATURATION: 98 % | SYSTOLIC BLOOD PRESSURE: 131 MMHG | HEIGHT: 67 IN

## 2024-10-26 DIAGNOSIS — N32.89 BLADDER SPASM: ICD-10-CM

## 2024-10-26 DIAGNOSIS — M79.89 FOOT SWELLING: ICD-10-CM

## 2024-10-26 DIAGNOSIS — M54.50 ACUTE LEFT-SIDED LOW BACK PAIN WITHOUT SCIATICA: Primary | ICD-10-CM

## 2024-10-26 LAB
BILIRUB SERPL-MCNC: NEGATIVE MG/DL
BLOOD URINE, POC: NORMAL
CLARITY, UA: NORMAL
COLOR, UA: YELLOW
D DIMER PPP FEU-MCNC: 0.34 ΜG/ML (ref 0–0.47)
GLUCOSE UR QL STRIP: NEGATIVE
KETONES UR QL STRIP: NEGATIVE
LEUKOCYTE ESTERASE URINE, POC: NEGATIVE
NITRITE, POC UA: NEGATIVE
PH, POC UA: 7.5
PROTEIN, POC: NORMAL
SPECIFIC GRAVITY, POC UA: 1.02
URATE SERPL-MCNC: 5.6 MG/DL (ref 3.5–7.2)
UROBILINOGEN, POC UA: 1

## 2024-10-26 PROCEDURE — 87086 URINE CULTURE/COLONY COUNT: CPT | Mod: ,,, | Performed by: CLINICAL MEDICAL LABORATORY

## 2024-10-26 PROCEDURE — 84550 ASSAY OF BLOOD/URIC ACID: CPT | Mod: ,,, | Performed by: CLINICAL MEDICAL LABORATORY

## 2024-10-26 PROCEDURE — 81003 URINALYSIS AUTO W/O SCOPE: CPT | Mod: QW,,, | Performed by: FAMILY MEDICINE

## 2024-10-26 PROCEDURE — 99214 OFFICE O/P EST MOD 30 MIN: CPT | Mod: ,,, | Performed by: FAMILY MEDICINE

## 2024-10-26 PROCEDURE — 99051 MED SERV EVE/WKEND/HOLIDAY: CPT | Mod: ,,, | Performed by: FAMILY MEDICINE

## 2024-10-26 RX ORDER — PREDNISONE 10 MG/1
10 TABLET ORAL DAILY
Qty: 3 TABLET | Refills: 0 | Status: SHIPPED | OUTPATIENT
Start: 2024-10-26 | End: 2024-10-29

## 2024-10-26 RX ORDER — FUROSEMIDE 20 MG/1
20 TABLET ORAL 2 TIMES DAILY
Qty: 60 TABLET | Refills: 11 | Status: SHIPPED | OUTPATIENT
Start: 2024-10-26 | End: 2025-10-26

## 2024-10-26 NOTE — PROGRESS NOTES
Subjective:       Patient ID: Kartik Segura is a 53 y.o. male.    Chief Complaint: Low-back Pain (Pt stated he is having bladder spasm along with lower back pain) and Urinary Tract Infection    Low-back Pain  Associated symptoms include arthralgias and joint swelling. Pertinent negatives include no abdominal pain, change in bowel habit, chest pain, chills, congestion, coughing, diaphoresis, fatigue, fever, headaches, myalgias, nausea, neck pain, numbness, rash, sore throat, vertigo, vomiting or weakness.   Urinary Tract Infection   Pertinent negatives include no chills, flank pain, frequency, hematuria, nausea, urgency, vomiting, constipation or rash.     Review of Systems   Constitutional:  Negative for activity change, appetite change, chills, diaphoresis, fatigue, fever and unexpected weight change.   HENT:  Negative for nasal congestion, dental problem, drooling, ear discharge, ear pain, facial swelling, hearing loss, mouth sores, nosebleeds, postnasal drip, rhinorrhea, sinus pressure/congestion, sneezing, sore throat, tinnitus, trouble swallowing, voice change and goiter.    Eyes:  Negative for photophobia, pain, discharge, redness, itching and visual disturbance.   Respiratory:  Negative for apnea, cough, choking, chest tightness, shortness of breath, wheezing and stridor.    Cardiovascular:  Negative for chest pain, palpitations, leg swelling and claudication.   Gastrointestinal:  Negative for abdominal distention, abdominal pain, anal bleeding, blood in stool, change in bowel habit, constipation, diarrhea, nausea, vomiting, reflux and fecal incontinence.   Endocrine: Negative for cold intolerance, heat intolerance, polydipsia, polyphagia and polyuria.   Genitourinary:  Negative for bladder incontinence, decreased urine volume, difficulty urinating, discharge, dysuria, enuresis, erectile dysfunction, flank pain, frequency, genital sores, hematuria, penile pain, testicular pain and urgency.    Musculoskeletal:  Positive for arthralgias, back pain and joint swelling. Negative for gait problem, myalgias, neck pain, neck stiffness and joint deformity.   Integumentary:  Negative for pallor, rash, wound and mole/lesion.   Allergic/Immunologic: Negative for environmental allergies, food allergies and frequent infections.   Neurological:  Negative for dizziness, vertigo, tremors, seizures, syncope, facial asymmetry, speech difficulty, weakness, light-headedness, numbness, headaches, memory loss and coordination difficulties.   Hematological:  Negative for adenopathy. Does not bruise/bleed easily.   Psychiatric/Behavioral:  Negative for agitation, behavioral problems, confusion, decreased concentration, dysphoric mood, hallucinations, self-injury, sleep disturbance and suicidal ideas. The patient is not nervous/anxious and is not hyperactive.          Objective:      Physical Exam  Vitals reviewed.   Constitutional:       Appearance: Normal appearance. He is normal weight.   HENT:      Head: Normocephalic and atraumatic.      Right Ear: Tympanic membrane and ear canal normal.      Left Ear: Tympanic membrane, ear canal and external ear normal.      Nose: Nose normal.      Mouth/Throat:      Mouth: Mucous membranes are moist.      Pharynx: Oropharynx is clear.   Eyes:      Extraocular Movements: Extraocular movements intact.      Conjunctiva/sclera: Conjunctivae normal.      Pupils: Pupils are equal, round, and reactive to light.   Cardiovascular:      Rate and Rhythm: Normal rate and regular rhythm.      Pulses: Normal pulses.      Heart sounds: Normal heart sounds.   Pulmonary:      Effort: Pulmonary effort is normal.      Breath sounds: Normal breath sounds.   Abdominal:      General: Abdomen is flat. Bowel sounds are normal.      Palpations: Abdomen is soft.   Musculoskeletal:         General: Tenderness present. Normal range of motion.      Cervical back: Normal range of motion and neck supple.       Comments: Mild ankle and foot swelling on right. No calf ttp, no calf swelling.    Skin:     General: Skin is warm and dry.   Neurological:      General: No focal deficit present.      Mental Status: He is alert and oriented to person, place, and time. Mental status is at baseline.   Psychiatric:         Mood and Affect: Mood normal.         Behavior: Behavior normal.         Thought Content: Thought content normal.         Judgment: Judgment normal.         Assessment:       1. Acute left-sided low back pain without sciatica    2. Bladder spasm    3. Foot swelling        Plan:     Acute left-sided low back pain without sciatica    Bladder spasm  -     POCT URINALYSIS W/O SCOPE    Foot swelling  -     furosemide (LASIX) 20 MG tablet; Take 1 tablet (20 mg total) by mouth 2 (two) times daily.  Dispense: 60 tablet; Refill: 11  -     D-Dimer, Quantitative; Future; Expected date: 10/26/2024  -     Urine culture; Future; Expected date: 10/26/2024  -     Uric Acid; Future; Expected date: 10/26/2024    Other orders  -     predniSONE (DELTASONE) 10 MG tablet; Take 1 tablet (10 mg total) by mouth once daily. for 3 days  Dispense: 3 tablet; Refill: 0

## 2024-10-28 LAB — UA COMPLETE W REFLEX CULTURE PNL UR: NO GROWTH

## 2025-04-04 ENCOUNTER — OFFICE VISIT (OUTPATIENT)
Dept: NEUROLOGY | Facility: CLINIC | Age: 55
End: 2025-04-04
Payer: COMMERCIAL

## 2025-04-04 VITALS
HEIGHT: 67 IN | WEIGHT: 270 LBS | SYSTOLIC BLOOD PRESSURE: 124 MMHG | BODY MASS INDEX: 42.38 KG/M2 | HEART RATE: 89 BPM | DIASTOLIC BLOOD PRESSURE: 102 MMHG | RESPIRATION RATE: 18 BRPM | OXYGEN SATURATION: 96 %

## 2025-04-04 DIAGNOSIS — G06.0 BRAIN ABSCESS: ICD-10-CM

## 2025-04-04 DIAGNOSIS — F32.A DEPRESSION, UNSPECIFIED DEPRESSION TYPE: Primary | ICD-10-CM

## 2025-04-04 DIAGNOSIS — E66.9 OBESITY, UNSPECIFIED CLASS, UNSPECIFIED OBESITY TYPE, UNSPECIFIED WHETHER SERIOUS COMORBIDITY PRESENT: ICD-10-CM

## 2025-04-04 DIAGNOSIS — Q05.3: ICD-10-CM

## 2025-04-04 DIAGNOSIS — M54.9 DORSALGIA, UNSPECIFIED: ICD-10-CM

## 2025-04-04 PROCEDURE — 99214 OFFICE O/P EST MOD 30 MIN: CPT | Mod: S$PBB,,, | Performed by: PSYCHIATRY & NEUROLOGY

## 2025-04-04 PROCEDURE — 99999 PR PBB SHADOW E&M-EST. PATIENT-LVL V: CPT | Mod: PBBFAC,,, | Performed by: PSYCHIATRY & NEUROLOGY

## 2025-04-04 PROCEDURE — 99215 OFFICE O/P EST HI 40 MIN: CPT | Mod: PBBFAC | Performed by: PSYCHIATRY & NEUROLOGY

## 2025-04-04 RX ORDER — NEOMYCIN SULFATE, POLYMYXIN B SULFATE, HYDROCORTISONE 3.5; 10000; 1 MG/ML; [USP'U]/ML; MG/ML
SOLUTION/ DROPS AURICULAR (OTIC)
COMMUNITY
Start: 2025-01-06

## 2025-04-04 RX ORDER — SEMAGLUTIDE 0.68 MG/ML
0.5 INJECTION, SOLUTION SUBCUTANEOUS
COMMUNITY
Start: 2025-03-20 | End: 2025-09-16

## 2025-04-04 RX ORDER — ROSUVASTATIN CALCIUM 5 MG/1
5 TABLET, COATED ORAL
COMMUNITY
Start: 2025-02-16 | End: 2026-02-16

## 2025-04-04 RX ORDER — NEOMYCIN SULFATE, POLYMYXIN B SULFATE AND DEXAMETHASONE 3.5; 10000; 1 MG/ML; [USP'U]/ML; MG/ML
SUSPENSION/ DROPS OPHTHALMIC
COMMUNITY
Start: 2025-03-20

## 2025-04-04 RX ORDER — FLUTICASONE PROPIONATE 50 MCG
2 SPRAY, SUSPENSION (ML) NASAL
COMMUNITY
Start: 2025-02-02

## 2025-04-04 NOTE — PATIENT INSTRUCTIONS
Cont current meds   Phys activity as tolerated   Mri Lumbar spine eval poss tethered cord syndrome   f/u 3 months

## 2025-04-04 NOTE — PROGRESS NOTES
Subjective:       Patient ID: Kartik Segura is a 54 y.o. male     Chief Complaint:    Chief Complaint   Patient presents with    syncope and collapse     Pt.  States leg weakness.        Allergies:  Cefuroxime, Cefuroxime axetil, Ciprofloxacin, Doxycycline hyclate, Sulfa (sulfonamide antibiotics), and Sulfamethoxazole-trimethoprim    Current Medications:  Encounter Medications[1]    History of Present Illness  54-year-old white male with spina bifida and neurogenic bladder status post craniotomy at [facility] for brain abscess 2 years ago in six-month follow-up after having some alterations of consciousness last fall-full stroke and seizure and syncopal workup was negative. No seizures noted in last couple years   Now w/ some subjective LE weakness L>R and especially when flexing hips   Last Mri L spine 2019 eval per [facility] for poss tethered cord syndrome        Past Medical History:   Diagnosis Date    Ascending aortic aneurysm     4.4 cm last CT    Brain abscess     Diabetes mellitus     Hypothyroidism, unspecified     Neurogenic bladder     ELENA (obstructive sleep apnea)     Pulmonary embolism     Spina bifida        Past Surgical History:   Procedure Laterality Date    BLADDER AUGMENTATION      CRANIOTOMY      spinda bifida repair      TONSILLECTOMY      TYMPANOSTOMY TUBE PLACEMENT Right     uvuloplasty         Social History  Mr. Segura  reports that he has never smoked. He has never been exposed to tobacco smoke. He has never used smokeless tobacco. He reports that he does not currently use alcohol.    Family History  Mr.'s Segura family history includes Heart disease in his father.    Review of Systems  Review of Systems   Musculoskeletal:  Positive for back pain.   Neurological:  Positive for focal weakness and weakness.   Psychiatric/Behavioral:  Positive for depression. The patient is nervous/anxious.    All other systems reviewed and are negative.   Objective:   BP (!) 124/102 (BP Location:  "Right arm, Patient Position: Sitting)   Pulse 89   Resp 18   Ht 5' 7" (1.702 m)   Wt 122.5 kg (270 lb)   SpO2 96%   BMI 42.29 kg/m²    NEUROLOGICAL EXAMINATION:     MENTAL STATUS   Oriented to person, place, and time.   Level of consciousness: alert  Knowledge: good.     CRANIAL NERVES   Cranial nerves II through XII intact.     MOTOR EXAM     Strength   Strength 5/5 except as noted.        Mild LE weakness L>R     GAIT AND COORDINATION     Gait  Gait: normal     Physical Exam  Vitals reviewed.   Constitutional:       Appearance: He is obese.   Neurological:      Mental Status: He is alert and oriented to person, place, and time. Mental status is at baseline.      Cranial Nerves: Cranial nerves 2-12 are intact.      Gait: Gait is intact.        Assessment:     Depression, unspecified depression type    Obesity, unspecified class, unspecified obesity type, unspecified whether serious comorbidity present         Primary Diagnosis and ICD10  Depression, unspecified depression type [F32.A]    Plan:     There are no Patient Instructions on file for this visit.    There are no discontinued medications.    Requested Prescriptions      No prescriptions requested or ordered in this encounter              [1]  Outpatient Encounter Medications as of 4/4/2025   Medication Sig Dispense Refill    albuterol (PROAIR HFA) 90 mcg/actuation inhaler Inhale 2 puffs into the lungs every 6 (six) hours as needed for Wheezing. Rescue 18 g 0    albuterol (VENTOLIN HFA) 90 mcg/actuation inhaler Inhale 2 puffs into the lungs every 6 (six) hours as needed for Wheezing. Rescue 18 g 0    ARIPiprazole (ABILIFY) 5 MG Tab Take 5 mg by mouth.      azithromycin (Z-MECCA) 250 MG tablet Take 2 tablets by mouth on day 1; Take 1 tablet by mouth on days 2-5 (Patient not taking: Reported on 10/26/2024) 6 tablet 0    buPROPion (WELLBUTRIN XL) 150 MG TB24 tablet Take 150 mg by mouth once daily.      cetirizine (ZYRTEC) 10 MG tablet One at bedtime " for allergies 90 tablet 3    ELIQUIS DVT-PE TREAT 30D START 5 mg (74 tabs) DsPk Take 1 tablet by mouth 2 (two) times a day.      ergocalciferol (ERGOCALCIFEROL) 50,000 unit Cap Take 50,000 Units by mouth every 7 days.      furosemide (LASIX) 20 MG tablet Take 1 tablet (20 mg total) by mouth 2 (two) times daily. 60 tablet 11    levETIRAcetam (KEPPRA) 500 MG Tab Take 500 mg by mouth once daily. (Patient not taking: Reported on 10/26/2024)      levothyroxine (SYNTHROID) 125 MCG tablet Take 125 mcg by mouth before breakfast.      melatonin 10 mg Cap  (Patient not taking: Reported on 10/26/2024)      metFORMIN (GLUCOPHAGE-XR) 500 MG ER 24hr tablet Take 500 mg by mouth once daily.      methocarbamoL (ROBAXIN) 500 MG Tab Take 500 mg by mouth 2 (two) times daily. (Patient not taking: Reported on 10/26/2024)      methylPREDNISolone (MEDROL DOSEPACK) 4 mg tablet use as directed (Patient not taking: Reported on 10/26/2024) 21 each 0    oxybutynin (DITROPAN) 5 MG Tab Take 5 mg by mouth once daily.      potassium chloride SA (K-DUR,KLOR-CON M) 10 MEQ tablet Take 20 mEq by mouth once daily.      propranoloL (INDERAL) 20 MG tablet Take 20 mg by mouth once daily.      RABEprazole (ACIPHEX) 20 mg tablet Take 20 mg by mouth once daily.      zolpidem (AMBIEN) 10 mg Tab Take 10 mg by mouth nightly as needed (.).       No facility-administered encounter medications on file as of 4/4/2025.

## 2025-04-24 ENCOUNTER — TELEPHONE (OUTPATIENT)
Dept: NEUROLOGY | Facility: CLINIC | Age: 55
End: 2025-04-24
Payer: COMMERCIAL

## 2025-07-14 ENCOUNTER — OFFICE VISIT (OUTPATIENT)
Dept: CARDIOLOGY | Facility: CLINIC | Age: 55
End: 2025-07-14
Payer: COMMERCIAL

## 2025-07-14 VITALS
HEIGHT: 67 IN | HEART RATE: 86 BPM | OXYGEN SATURATION: 95 % | DIASTOLIC BLOOD PRESSURE: 86 MMHG | WEIGHT: 268.38 LBS | SYSTOLIC BLOOD PRESSURE: 128 MMHG | BODY MASS INDEX: 42.12 KG/M2

## 2025-07-14 DIAGNOSIS — I71.21 ANEURYSM OF ASCENDING AORTA WITHOUT RUPTURE: ICD-10-CM

## 2025-07-14 DIAGNOSIS — I10 ESSENTIAL HYPERTENSION: Primary | ICD-10-CM

## 2025-07-14 PROCEDURE — 93010 ELECTROCARDIOGRAM REPORT: CPT | Mod: S$PBB,,, | Performed by: INTERNAL MEDICINE

## 2025-07-14 PROCEDURE — 93005 ELECTROCARDIOGRAM TRACING: CPT | Mod: PBBFAC | Performed by: INTERNAL MEDICINE

## 2025-07-14 PROCEDURE — 99202 OFFICE O/P NEW SF 15 MIN: CPT | Mod: S$PBB,,, | Performed by: REGISTERED NURSE

## 2025-07-14 PROCEDURE — 99999 PR PBB SHADOW E&M-EST. PATIENT-LVL V: CPT | Mod: PBBFAC,,, | Performed by: REGISTERED NURSE

## 2025-07-14 PROCEDURE — 99215 OFFICE O/P EST HI 40 MIN: CPT | Mod: PBBFAC | Performed by: REGISTERED NURSE

## 2025-07-14 NOTE — PROGRESS NOTES
PCP: Cher Blount MD (Inactive)    Referring Provider:     Subjective:   Kartik Segura is a 54 y.o. male with hx of DM, HTN, stable ascending aortic aneurysm (4.1-4.3cm) who presents for followup.     7/9/24 - Doing well. Was at [facility] for chest pain was admitted overnight. Will get records. Reports getting an ECHo and CT scan.     6/30/203 - Doing well. Denies any symptoms.     12/2021 - Doing well. A1c is controlled at 5.4. Denies any CP, SOB, AYERS.     Fhx:   Shx: denies smoking, etoh or drug use     EKG 12/22/21 - NSR   CTA aorta 9/2021 - No significant enlargement of the ascending aorta  CTA aorta 7/2023 -  Aneurysmal dilatation of the ascending thoracic aorta measuring up to 4.4 cm. This is similar to prior examination. Borderline cardiomegaly. Small area of ground-glass atelectasis/scarring within the dorsal/inferior lingula.     Lab Results   Component Value Date     06/30/2023    K 4.0 06/30/2023     (H) 06/30/2023    CO2 28 06/30/2023    BUN 16 06/30/2023    CREATININE 0.97 06/30/2023    CALCIUM 9.2 06/30/2023    ANIONGAP 11 06/30/2023    ESTGFRAFRICA 127 10/15/2020    EGFRNONAA 105 10/15/2020       Lab Results   Component Value Date    CHOL 190 10/15/2020     Lab Results   Component Value Date    HDL 43 10/15/2020     Lab Results   Component Value Date    LDLCALC 106 10/15/2020     Lab Results   Component Value Date    TRIG 206 10/15/2020     Lab Results   Component Value Date    CHOLHDL 4.4 10/15/2020       Lab Results   Component Value Date    WBC 6.21 10/15/2020    HGB 13.3 (L) 10/15/2020    HCT 41.6 10/15/2020    MCV 82.1 10/15/2020     10/15/2020           Review of Systems   Respiratory:  Negative for cough and shortness of breath.    Cardiovascular:  Negative for chest pain, palpitations, orthopnea, claudication, leg swelling and PND.   All other systems reviewed and are negative.        Objective:   /86 (BP Location: Left arm, Patient Position: Sitting)   Pulse 86   " Ht 5' 7" (1.702 m)   Wt 121.7 kg (268 lb 6.4 oz)   SpO2 95%   BMI 42.04 kg/m²     Physical Exam  Vitals and nursing note reviewed.   Constitutional:       General: He is not in acute distress.     Appearance: He is obese.   Eyes:      Extraocular Movements: Extraocular movements intact.   Cardiovascular:      Rate and Rhythm: Normal rate and regular rhythm.      Pulses: Normal pulses.      Heart sounds: Normal heart sounds.   Pulmonary:      Effort: Pulmonary effort is normal.      Breath sounds: Normal breath sounds.   Musculoskeletal:         General: No swelling. Normal range of motion.   Skin:     General: Skin is warm and dry.      Capillary Refill: Capillary refill takes less than 2 seconds.   Neurological:      General: No focal deficit present.      Mental Status: He is alert and oriented to person, place, and time.      Cranial Nerves: No cranial nerve deficit.           Assessment:     1. Essential hypertension  EKG 12-lead    EKG 12-lead      2. Aneurysm of ascending aorta without rupture          Assessment & Plan    I10 Essential hypertension    IMPRESSION:  - Aortic aneurysm last measured at 4.4 cm. Plan to continue monitoring every 3 years, with intervention typically considered at 7 cm.  - Blood pressure control important for aneurysm management.  - Liver enzyme levels (AST 44, ALT 81) slightly elevated in context of Crestor use. Plan to follow up with repeat testing.    ESSENTIAL HYPERTENSION:  - Discussed potential long-term health impacts of untreated sleep apnea, including effects on vital organs and blood pressure.  - Provided information on alternative CPAP mask options, such as nasal pillows, which may be more tolerable for patients with claustrophobia.  - Patient to contact CPAP supply company to inquire about nasal pillow mask options as an alternative to full face mask.  - Patient to bring Ozempic pen to next appointment with Dr. Blount for proper instruction on usage.  - Continued " Crestor for cholesterol.  - Will order CT Aorta at next visit.  - Follow up in 1 year.  - Contact the office if any issues arise before the next scheduled visit.           Plan:

## 2025-07-15 LAB
OHS QRS DURATION: 78 MS
OHS QTC CALCULATION: 439 MS